# Patient Record
Sex: FEMALE | Race: WHITE | Employment: PART TIME | URBAN - METROPOLITAN AREA
[De-identification: names, ages, dates, MRNs, and addresses within clinical notes are randomized per-mention and may not be internally consistent; named-entity substitution may affect disease eponyms.]

---

## 2017-01-20 ENCOUNTER — HOSPITAL ENCOUNTER (OUTPATIENT)
Dept: PHYSICAL THERAPY | Age: 42
Discharge: HOME | End: 2017-01-20
Payer: OTHER GOVERNMENT

## 2017-01-20 PROCEDURE — 97110 THERAPEUTIC EXERCISES: CPT

## 2017-01-20 PROCEDURE — 97161 PT EVAL LOW COMPLEX 20 MIN: CPT

## 2017-01-20 PROCEDURE — 97014 ELECTRIC STIMULATION THERAPY: CPT

## 2017-01-20 PROCEDURE — 97140 MANUAL THERAPY 1/> REGIONS: CPT

## 2017-01-20 NOTE — PROGRESS NOTES
30 [facility] PHYSICAL THERAPY AT [facility]   [address] [facility] [address] [address]  Phone: [phone] Fax: [phone] / [address] [address] PHYSICAL THERAPY SERVICES  Patient Name: Lg Carter : 1975   Medical   Diagnosis: Acute back pain [M54.9] Treatment Diagnosis: LS and L SIJ pain    Onset Date: 2016      Referral Source: Jose Escobar MD Start of Care [facility]): 2017   Prior Hospitalization: See medical history Provider #: [id number]   Prior Level of Function: Functional I    Comorbidities: None noted    Medications: Verified on Patient Summary List   The Plan of Care and following information is based on the information from the initial evaluation.   ========================================================================  Assessment / key information:  Pt is a 46y.o. year old female who presents with co L sided SIJ and glut pain starting 2016 after intense [place] workout followed by long car drive (5 hours). Patient is alleviated by [facility]. Patient attempted chiropractic, but with increased pain. Patient had xay and MRI and was dx with \"early onset\" arthritis. Current deficits include: increased pain to 10/10 at worst, tautness of L gluteals and rotators effect SI mobility. Functional deficits include: increased am pain, laying supine and prone (ie lake kick ab exercise), sittin hour tolerance (work duties), elliptical, childcare - lifting, bending over back tub, unable to go barefoot sec to increased pain . Home exercise program initiated on initial evaluation to address these deficits.  Pt would benefit from PT to address these deficits for increased functional mobility and QOL.  ========================================================================  Eval Complexity: History: LOW Complexity : Zero comorbidities / personal factors that will impact the outcome / POCExam:HIGH Complexity : 4+ Standardized tests and measures addressing body structure, function, activity limitation and / or participation in recreation  Presentation: LOW Complexity : Stable, uncomplicated  Clinical Decision Making:MEDIUM Complexity : FOTO score of 26-74Overall Complexity:MEDIUM  Problem List: pain affecting function, decrease strength and decrease flexibility/ joint mobility   Treatment Plan may include any combination of the following: Therapeutic exercise, Therapeutic activities, Neuromuscular re-education, Physical agent/modality, Gait/balance training, Manual therapy, Aquatic therapy, Patient education and Other: IMT/ dry needling as needed  Patient / Family readiness to learn indicated by: asking questions, trying to perform skills and interest  Persons(s) to be included in education: patient (P)  Barriers to Learning/Limitations: None  Measures taken:    Patient Goal (s): \"wake without pain \"   Patient self reported health status: excellent  Rehabilitation Potential: good   Short Term Goals: To be accomplished in  1  weeks:  1. Pt will be independent and compliant with HEP   Long Term Goals: To be accomplished in  8-12  treatments:  1. Patient will increase FOTO score to 78/100 for indications of increased functional mobility. 2.  Patient will report pain less than 2/10 in am for ease with morning activities   3. Patient will be able to lay prone with min pain for return to preferred sleeping position at night   4.  Patient will be able to perform 100% bridge with no pain for improve SIJ stability and decrease risk of injury with lifting children   Frequency / Duration:   Patient to be seen  2  times per week for 8-12  treatments:  Patient / Caregiver education and instruction: self care, activity modification and exercises  G-Codes (GP): SAYRA  Therapist Signature: Nestor Rush PT Date: 5/69/0573   Certification Period: NA Time: 10:06 AM   ========================================================================  I certify that the above Physical Therapy Services are being furnished while the patient is under my care. I agree with the treatment plan and certify that this therapy is necessary. Physician Signature:        Date:       Time:   Please sign and return to [facility] or you may fax the signed copy to [phone]. Thank you.

## 2017-01-20 NOTE — PROGRESS NOTES
PT LUMBAR EVAL AND TREATMENT     Patient Name: Richard Beltran  Date:2017  : 1975  [x]  Patient  Verified  Payor:  / Plan: [employer] [address] AND DEPENDENTS / Product Type:  /    In time:1007  Out time:1105  Total Treatment Time (min): 58  Visit #: 1 of 8-12    Treatment Area: Acute back pain [M54.9]    SUBJECTIVE  Pain Level (0-10 scale): (C): 1 (B): 1 (W):  10  Any medication changes, allergies to medications, diagnosis change, or new procedure performed: see summary sheet for update  Subjective functional status/changes  CHIEF COMPLAINT: Patient reports with co L sided SIJ and glut pain starting 2016 after intense [place] workout followed by long car drive (5 hours). Patient is alleviated by [facility] [place] exercise. Patient attempted chiropractic, but with increased pain. Patient had xay and MRI and was dx with \"early onset\" arthritis.     Functional Status  Present functional limitations: increased am pain, laying supine and prone (ie lake kick ab exercise), sittin hour tolerance (work duties), elliptical, childcare - lifting, bending over back tub, unable to go barefoot sec to increased pain   Mechanism of injury: exercise   Symptoms:  Aggravated by: see functional limitations   Eased by: [facility] [place]     Past History/Treatments:  None noted   Diagnostic Tests: Xray     OBJECTIVE  Posture:  Lateral Shift: WNL   Kyphosis: WNL   Lordosis:  WNL     Gait:  WNL     Active Movements:  ROM % AROM Comments:pain, area   Forward flexion 40-60 WNL throughout No significant increase in pain    Extension 20-30     SB right 20-30     SB left 20-30     Rotation right 5-10     Rotation left 5-10         Dural Mobility:  Seated slump test denies radicular sx     Palpation taut bands of L gluts and piriformis     Strength  Hip : 5/5 grossly   Core: R sided pain     Special Tests: negative SI  gapping / compression tests     [place]:     Standing forward flexion test: WNL     Crests: equal     ASIS: equal     PSIS: equal          Hip: Lee Ann Test NT     Piriformis: - mild tightness B           Deficits: Kayley's:  NT    Ray:  NT    Hamstrings 90/90: mild tightness     Gastrocsoleus WNL     Other tests/comments:    Manual 10 min: piriformis/ gluteal  release in prone with hip IR/ER    Modality (rationale): decrease m spasm   [x]  E-Stim: type HV  x 10 min   Prone to L gluts with ice       Patient Education: [x]Established HEP    [x] POT  (minutes) :8    Pain Level (0-10 scale) post treatment: 0    ASSESSMENT  [x]  See Plan of Care    PLAN  [x]  Upgrade activities as tolerated      [x] Other:_  POC 2x8-12  Carmen Fuentes, PT 1/20/2017  10:05 AM    Justification for Eval Code Complexity:  Patient History : chronicity   Examination see exam   Clinical Presentation: stable   Clinical Decision Making : CARLA 09/930

## 2017-01-23 ENCOUNTER — HOSPITAL ENCOUNTER (OUTPATIENT)
Dept: PHYSICAL THERAPY | Age: 42
Discharge: HOME | End: 2017-01-23
Payer: OTHER GOVERNMENT

## 2017-01-23 PROCEDURE — 97110 THERAPEUTIC EXERCISES: CPT

## 2017-01-23 PROCEDURE — 97140 MANUAL THERAPY 1/> REGIONS: CPT

## 2017-01-23 PROCEDURE — 97014 ELECTRIC STIMULATION THERAPY: CPT

## 2017-01-23 NOTE — PROGRESS NOTES
PT DAILY TREATMENT NOTE     Patient Name: Karen Carl  Date:2017  : 1975  [x]  Patient  Verified  Payor:  / Plan: [employer] [address] AND DEPENDENTS / Product Type:  /    In time: 3:30pm     Out time: 4:12pm  Total Treatment Time (min): 42  Visit #: 2 of     Treatment Area: Acute back pain [M54.9]    SUBJECTIVE  Pain Level (0-10 scale): 0  Any medication changes, allergies to medications, adverse drug reactions, diagnosis change, or new procedure performed?: [x] No    [] Yes (see summary sheet for update)  Subjective functional status/changes:   [] No changes reported  \"I have been doing the stretches without a problem. 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   Min Type Additional Details   10 [x] Estim: []Att   [x]Unatt                 [x]HV                     []w/US   [x]w/ice   []w/heat  Position: prone  Location: (L) glut    []  Traction: [] Cervical       []Lumbar                       [] Prone          []Supine                       []Intermittent   []Continuous Lbs:  [] before manual  [] after manual    []  Ultrasound: []Continuous   [] Pulsed                           []1MHz   []3MHz Location:  W/cm2:    []  Iontophoresis with dexamethasone         Location: [] Take home patch   [] In clinic    []  Ice     []  heat  []  Ice massage Position:  Location:    []  Vasopneumatic Device Pressure:       [] lo [] med [] hi   Temperature: [] lo [] med [] hi   [x] Skin assessment post-treatment:  [x]intact []redness- no adverse reaction       []redness  adverse reaction:     22 min Therapeutic Exercise:  [x] See flow sheet: initiated therex per IE   Rationale: increase ROM and increase strength to improve the patients ability to perform work duties pain-free    10 min Manual Therapy:  prone piriformis and lower deep gluteal release/DTM in prone with hip IR/ER PROM   Rationale: decrease pain, increase ROM, increase tissue extensibility and decrease trigger points to improve patient's mobility for l          X min Patient Education: [x] Review HEP and progressed to include HAL therex 1 and 2       Other Objective/Functional Measures:     Pain Level (0-10 scale) post treatment: 0    ASSESSMENT/Changes in Function:   Good tolerance to first f/u treatment with patient req additional cueing for HAL therex, but able to demo proper isolation with repetition. Improved glut mm extensibility with emphasis on lower deep gluteals following manual interventions. Good release. Patient will continue to benefit from skilled PT services to modify and progress therapeutic interventions, address functional mobility deficits, address ROM deficits, address strength deficits, analyze and address soft tissue restrictions, analyze and cue movement patterns, analyze and modify body mechanics/ergonomics and assess and modify postural abnormalities to attain remaining goals. [x]  See Plan of Care  []  See progress note/recertification  []  See Discharge Summary         Progress towards goals / Updated goals:  Short Term Goals: To be accomplished in 1 weeks:  1. Pt will be independent and compliant with HEP -Goal met; patient reporting compliance (1/23/17)  Long Term Goals: To be accomplished in 8-12 treatments:  1. Patient will increase FOTO score to 78/100 for indications of increased functional mobility. 2. Patient will report pain less than 2/10 in am for ease with morning activities   3. Patient will be able to lay prone with min pain for return to preferred sleeping position at night -Goal met; patient able to lay prone during HVES with CP pain-free (1/23/17)  4.  Patient will be able to perform 100% bridge with no pain for improve SIJ stability and decrease risk of injury with lifting children     PLAN  [x]  Upgrade activities as tolerated     [x]  Continue plan of care  []  Update interventions per flow sheet       []  Discharge due to:_  [x]  Other: add HAL 3 and 4 to barry Dempsey, PTA 1/23/2017

## 2017-01-27 ENCOUNTER — HOSPITAL ENCOUNTER (OUTPATIENT)
Dept: PHYSICAL THERAPY | Age: 42
Discharge: HOME | End: 2017-01-27
Payer: OTHER GOVERNMENT

## 2017-01-27 PROCEDURE — 97014 ELECTRIC STIMULATION THERAPY: CPT

## 2017-01-27 PROCEDURE — 97110 THERAPEUTIC EXERCISES: CPT

## 2017-01-27 PROCEDURE — 97140 MANUAL THERAPY 1/> REGIONS: CPT

## 2017-01-27 NOTE — PROGRESS NOTES
PT DAILY TREATMENT NOTE     Patient Name: Lg Carter  Date:2017  : 1975  [x]  Patient  Verified  Payor:  / Plan: Noel [facility] DEPENDENTS / Product Type:  /    In time: 9:28am     Out time: 10:30am  Total Treatment Time (min): 62  Visit #: 3 of     Treatment Area: Acute back pain [M54.9]    SUBJECTIVE  Pain Level (0-10 scale): 2  Any medication changes, allergies to medications, adverse drug reactions, diagnosis change, or new procedure performed?: [x] No    [] Yes (see summary sheet for update)  Subjective functional status/changes:   [] No changes reported  \"I've been using a small pink bucket for the first exercise. I haven't seen much a change yet (*patient referring to return to pain-free activity*), but the pain hasn't been any more than a 3/10 (morning). I've been working a lot recently though. 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   Min Type Additional Details   10 [x] Estim:   [x]Unatt                   [x]HV                     []w/US   []w/ice   [x]w/heat  Position: prone with foam roll under ankles  Location: (L) piriformis    []  Traction: [] Cervical       []Lumbar                       [] Prone          []Supine                       []Intermittent   []Continuous Lbs:  [] before manual  [] after manual    []  Ultrasound: []Continuous   [] Pulsed                           []1MHz   []3MHz Location:  W/cm2:    []  Iontophoresis with dexamethasone         Location: [] Take home patch   [] In clinic    []  Ice     []  heat  []  Ice massage Position:  Location:    []  Vasopneumatic Device Pressure:       [] lo [] med [] hi   Temperature: [] lo [] med [] hi   [x] Skin assessment post-treatment:  [x]intact []redness- no adverse reaction       []redness  adverse reaction:     42 min Therapeutic Exercise:  [x] See flow sheet: added HAL #3 and #4   Rationale: increase ROM and increase strength to improve the patients ability to perform work duties pain-free    10 min Manual Therapy:  prone piriformis and lower deep gluteal release/DTM in prone with hip IR/ER PROM   Rationale: decrease pain, increase ROM, increase tissue extensibility and decrease trigger points to improve patient's mobility for l          X min Patient Education: [x] Review HEP and progressed to include HAL therex #3 and #4     Other Objective/Functional Measures:    Pain in AM: 3/10    Pain Level (0-10 scale) post treatment: 0    ASSESSMENT/Changes in Function:   Good tolerance to progression of therex to include HAL #3 and #4 with patient req additional cueing for proper form/technique with all therex. Following HAL#4, patient r/o (R) low back discomfort, possibly QL, therefore instructed patient to reduce UE lift at home to prevent compensation. Patient req cueing to maintain (R) LE in line with trunk during HAL#4. Noted inc mobility into hip IR L>R in prone position. Switched CP to MHP to aid in mm relaxation with bolster placed under ankles sec discomfort with prone position today. Patient will continue to benefit from skilled PT services to modify and progress therapeutic interventions, address functional mobility deficits, address ROM deficits, address strength deficits, analyze and address soft tissue restrictions, analyze and cue movement patterns, analyze and modify body mechanics/ergonomics and assess and modify postural abnormalities to attain remaining goals. [x]  See Plan of Care  []  See progress note/recertification  []  See Discharge Summary         Progress towards goals / Updated goals:  Short Term Goals: To be accomplished in 1 weeks:  1. Pt will be independent and compliant with HEP -Goal met; patient reporting strict compliance (1/27/17)  Long Term Goals: To be accomplished in 8-12 treatments:  1.  Patient will increase FOTO score to 78/100 for indications of increased functional mobility. 2. Patient will report pain less than 2/10 in am for ease with morning activities -Goal progressing at 3/10 worst pain, AM (1/27/17)  3. Patient will be able to lay prone with min pain for return to preferred sleeping position at night -Goal progressing; (+) pain at (L) low back and glut with prone lying (1/27/17)  4.  Patient will be able to perform 100% bridge with no pain for improve SIJ stability and decrease risk of injury with lifting children     PLAN  [x]  Upgrade activities as tolerated     [x]  Continue plan of care  []  Update interventions per flow sheet       []  Discharge due to:_  [x]  Other: assess pelvic alignment YARITZA Houston, PTA 1/27/2017

## 2017-01-30 ENCOUNTER — HOSPITAL ENCOUNTER (OUTPATIENT)
Dept: PHYSICAL THERAPY | Age: 42
Discharge: HOME | End: 2017-01-30
Payer: OTHER GOVERNMENT

## 2017-01-30 PROCEDURE — 97014 ELECTRIC STIMULATION THERAPY: CPT

## 2017-01-30 PROCEDURE — 97140 MANUAL THERAPY 1/> REGIONS: CPT

## 2017-01-30 PROCEDURE — 97110 THERAPEUTIC EXERCISES: CPT

## 2017-01-30 NOTE — PROGRESS NOTES
PT DAILY TREATMENT NOTE     Patient Name: Portia Wilson  Date:2017  : 1975  [x]  Patient  Verified  Payor:  / Plan: [facility] [address] AND DEPENDENTS / Product Type:  /    In time: 10:56am     Out time: 11:50am  Total Treatment Time (min): 54  Visit #: 4 of     Treatment Area: Acute back pain [M54.9]    SUBJECTIVE  Pain Level (0-10 scale): 1  Any medication changes, allergies to medications, adverse drug reactions, diagnosis change, or new procedure performed?: [x] No    [] Yes (see summary sheet for update)  Subjective functional status/changes:   [] No changes reported  \"I had a little pain, but not much. I have to leave by 11:50pm to  my mom. 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   Min Type Additional Details   10 [x] Estim:   [x]Unatt                   [x]IFC                  []w/US   [x]w/ice   []w/heat  Position: prone  Location: L/S    []  Traction: [] Cervical       []Lumbar                       [] Prone          []Supine                       []Intermittent   []Continuous Lbs:  [] before manual  [] after manual    []  Ultrasound: []Continuous   [] Pulsed                           []1MHz   []3MHz Location:  W/cm2:    []  Iontophoresis with dexamethasone         Location: [] Take home patch   [] In clinic    []  Ice     []  heat  []  Ice massage Position:  Location:    []  Vasopneumatic Device Pressure:       [] lo [] med [] hi   Temperature: [] lo [] med [] hi   [x] Skin assessment post-treatment:  [x]intact []redness- no adverse reaction       []redness  adverse reaction:     34 min Therapeutic Exercise:  [x] See flow sheet   Rationale: increase ROM and increase strength to improve the patients ability to perform work duties pain-free    10 min Manual Therapy:  SI check - (R) post rotation corrected with MET and shot gun; sacral float; prone piriformis and lower deep gluteal release/DTM in prone with hip IR/ER PROM   Rationale: decrease pain, increase ROM, increase tissue extensibility and decrease trigger points to improve patient's mobility for l          X min Patient Education: [x] Review HEP     Other Objective/Functional Measures:    SIJ stability: 80% bridge, pain-free, improved to 100% bridge following MET   (-) TTP to (R) iliopsoas    Pain Level (0-10 scale) post treatment: 0    ASSESSMENT/Changes in Function:   Patient is making good progress towards LTG#4. Notable pelvic obliquity corrected easily with audible click during shot gun. Noted patient amb with inc (R) toe out during TM walking, symmetrical following manual interventions. Patient will continue to benefit from skilled PT services to modify and progress therapeutic interventions, address functional mobility deficits, address ROM deficits, address strength deficits, analyze and address soft tissue restrictions, analyze and cue movement patterns, analyze and modify body mechanics/ergonomics and assess and modify postural abnormalities to attain remaining goals. [x]  See Plan of Care  []  See progress note/recertification  []  See Discharge Summary         Progress towards goals / Updated goals:  Short Term Goals: To be accomplished in 1 weeks:  1. Pt will be independent and compliant with HEP -Goal met; patient reporting strict compliance (1/27/17)  Long Term Goals: To be accomplished in 8-12 treatments:  1. Patient will increase FOTO score to 78/100 for indications of increased functional mobility. 2. Patient will report pain less than 2/10 in am for ease with morning activities -Goal progressing at 3/10 worst pain, AM (1/27/17)  3. Patient will be able to lay prone with min pain for return to preferred sleeping position at night -Goal met; patient able to lay prone following sacral float and MET (1/30/17)  4.  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  -Goal progressing, 100% bridge following manual interventions (1/30/17)    PLAN  [x]  Upgrade activities as tolerated     [x]  Continue plan of care  []  Update interventions per flow sheet       []  Discharge due to:_  [x]  Other: assess SIJ mobility NV and add self-correct to HEP if obliquity present    Mark Devoid, PTA 1/30/2017

## 2017-02-03 ENCOUNTER — HOSPITAL ENCOUNTER (OUTPATIENT)
Dept: PHYSICAL THERAPY | Age: 42
Discharge: HOME | End: 2017-02-03
Payer: OTHER GOVERNMENT

## 2017-02-03 PROCEDURE — 97014 ELECTRIC STIMULATION THERAPY: CPT

## 2017-02-03 PROCEDURE — 97110 THERAPEUTIC EXERCISES: CPT

## 2017-02-03 NOTE — PROGRESS NOTES
PT DAILY TREATMENT NOTE     Patient Name: Lambert Aase  Date:2/3/2017  : 1975  [x]  Patient  Verified  Payor:  / Plan: [employer] [address] AND DEPENDENTS / Product Type:  /    In time: 2:31pm     Out time: 3:21pm  Total Treatment Time (min): 50  Visit #: 5 of -    Treatment Area: Acute back pain [M54.9]    SUBJECTIVE  Pain Level (0-10 scale): 0  Any medication changes, allergies to medications, adverse drug reactions, diagnosis change, or new procedure performed?: [x] No    [] Yes (see summary sheet for update)  Subjective functional status/changes:   [] No changes reported  \"I have been feeling great! No back or hip pain since last time. I went back to running a little bit. Now, I just have this plantar fasciitis. 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   Min Type Additional Details   10 [x] Estim:   [x]Unatt                   [x]IFC                  []w/US   [x]w/ice   []w/heat  Position: prone  Location: L/S    []  Traction: [] Cervical       []Lumbar                       [] Prone          []Supine                       []Intermittent   []Continuous Lbs:  [] before manual  [] after manual    []  Ultrasound: []Continuous   [] Pulsed                           []1MHz   []3MHz Location:  W/cm2:    []  Iontophoresis with dexamethasone         Location: [] Take home patch   [] In clinic    []  Ice     []  heat  []  Ice massage Position:  Location:    []  Vasopneumatic Device Pressure:       [] lo [] med [] hi   Temperature: [] lo [] med [] hi   [x] Skin assessment post-treatment:  [x]intact []redness- no adverse reaction       []redness  adverse reaction:     39 min Therapeutic Exercise:  [x] See flow sheet: progressed HAL to 10x each   Rationale: increase ROM and increase strength to improve the patients ability to perform work duties pain-free    1 min Manual Therapy:  SI check - no rotation   Rationale: decrease pain, increase ROM, increase tissue extensibility and decrease trigger points 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 Patient reports being pain-free since last treatment and able to awake and sleep throughout the night pain-free. Notable improvement in functional mobility as patient able to return to running for short-distances. Patient r/o fear of pain returning and request to cont PT for remaining scheduled appointments as allotted by POC. Patient will continue to benefit from skilled PT services to modify and progress therapeutic interventions, address functional mobility deficits, address ROM deficits, address strength deficits, analyze and address soft tissue restrictions, analyze and cue movement patterns, analyze and modify body mechanics/ergonomics and assess and modify postural abnormalities to attain remaining goals. [x]  See Plan of Care  []  See progress note/recertification  []  See Discharge Summary         Progress towards goals / Updated goals:  Short Term Goals: To be accomplished in 1 weeks:  1. Pt will be independent and compliant with HEP -Goal met; patient reporting strict compliance (1/27/17)  Long Term Goals: To be accomplished in 8-12 treatments:  1. Patient will increase FOTO score to 78/100 for indications of increased functional mobility. 2. Patient will report pain less than 2/10 in am for ease with morning activities -Goal met; 0/10 pain in AM for past week (2/3/17)  3.  Patient will be able to lay prone with min pain for return to preferred sleeping position at night -Goal met; patient able to lay prone following sacral float and MET (1/30/17) - good carryover, pain-free in prone (2/3/17)  4. 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  -Goal progressing, 100% bridge following manual interventions (1/30/17)     PLAN  [x]  Upgrade activities as tolerated     [x]  Continue plan of care  []  Update interventions per flow sheet       []  Discharge due to:_  []  Other:_    Nuzhat Houston, PTA 2/3/2017

## 2017-02-06 ENCOUNTER — HOSPITAL ENCOUNTER (OUTPATIENT)
Dept: PHYSICAL THERAPY | Age: 42
Discharge: HOME | End: 2017-02-06
Payer: OTHER GOVERNMENT

## 2017-02-06 PROCEDURE — 97014 ELECTRIC STIMULATION THERAPY: CPT

## 2017-02-06 PROCEDURE — 97110 THERAPEUTIC EXERCISES: CPT

## 2017-02-06 NOTE — PROGRESS NOTES
PT DAILY TREATMENT NOTE     Patient Name: Dayton Cha  Date:2017  : 1975  [x]  Patient  Verified  Payor:  / Plan: [employer] [facility] [address] AND DEPENDENTS / Product Type:  /    In time:9:32  Out time:10:34  Total Treatment Time (min): 62  Total Timed Codes (min): 52  1:1 Treatment Time (min): 52   Visit #: 6 of     Treatment Area: Acute back pain [M54.9]    SUBJECTIVE  Pain Level (0-10 scale): 0  Any medication changes, allergies to medications, adverse drug reactions, diagnosis change, or new procedure performed?: [x] No    [] Yes (see summary sheet for update)  Subjective functional status/changes:   [] No changes reported  The L foot, heel pain is the worst now. Bad in the morning. 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 OBJECTIVE  Modality rationale: decrease pain and increase tissue extensibility to improve the patients ability to improve return to running    Min Type Additional Details   10 [x] Estim: []Att   [x]Unatt        []TENS instruct                  []IFC  []Premod   []NMES                     [x]Other: HV []w/US   [x]w/ice   []w/heat  Position: prone  Location:L/S   [x] Skin assessment post-treatment:  [x]intact []redness- no adverse reaction       []redness  adverse reaction:       48 min Therapeutic Exercise:  [x] See flow sheet :   Rationale: increase ROM, increase strength and improve coordination to improve the patients ability to return to running     4 min Manual Therapy:  R posterior innom MET, shotgun. Rationale: decrease pain, increase ROM, increase tissue extensibility and decrease trigger points to improve mobility, lifting, flexion           x min Patient Education: [x] Review HEP    [x] Progressed/Changed HEP based on: TA with dead bug, shotgun and self-correction; REIL to rule-out radicular sxs in the foot.      [] positioning   [] body mechanics   [] transfers   [] heat/ice application        Other Objective/Functional Measures:   TA contraction: pt dominates with RA contraction. Able to improve with VC and TC  Pt demo's difficulty with L LE dead bug, given for HEP  Plyos: initiate jumping in place, forward/backwards and side to side for 30\" with no c/o SIJ or back pain. Min c/o Plantar fascia pain that is unchanged with REIL  Self-correction: R posterior innom    Pain Level (0-10 scale) post treatment: 0    ASSESSMENT/Changes in Function: Pt demo's R posterior innom rotation, minimally, corrected with Shotgun and MET. Given MET for HEP to self-correct. Good response to plyo initiation today for a return to run. Pt challenged by single leg/split stance activities and maintaining pelvic positioning/TA draw. Initiated simple dead bugs and single leg bridges to improve     Patient will continue to benefit from skilled PT services to modify and progress therapeutic interventions, address functional mobility deficits, address ROM deficits, address strength deficits, analyze and address soft tissue restrictions, analyze and cue movement patterns, analyze and modify body mechanics/ergonomics, assess and modify postural abnormalities and instruct in home and community integration to attain remaining goals. []  See Plan of Care  []  See progress note/recertification  []  See Discharge Summary         Progress towards goals / Updated goals:  Short Term Goals: To be accomplished in 1 weeks:  1. Pt will be independent and compliant with HEP -Goal met; patient reporting strict compliance (1/27/17)  Long Term Goals: To be accomplished in 8-12 treatments:  1. Patient will increase FOTO score to 78/100 for indications of increased functional mobility. 2. Patient will report pain less than 2/10 in am for ease with morning activities -Goal met; 0/10 pain in AM for past week (2/3/17)  3.  Patient will be able to lay prone with min pain for return to preferred sleeping position at night -Goal met; patient able to lay prone following sacral float and MET (1/30/17) - good carryover, pain-free in prone (2/3/17)  4. 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  -Goal progressing, 100% bridge and progressed to single leg, but reduced ROM (2/6/17)  PLAN  [x]  Upgrade activities as tolerated     []  Continue plan of care  []  Update interventions per flow sheet       []  Discharge due to:_  [x]  Other:_ assess plyometrics     Connor Vernon, PT 2/6/2017  7:47 AM

## 2017-02-10 ENCOUNTER — HOSPITAL ENCOUNTER (OUTPATIENT)
Dept: PHYSICAL THERAPY | Age: 42
Discharge: HOME | End: 2017-02-10
Payer: OTHER GOVERNMENT

## 2017-02-10 PROCEDURE — 97110 THERAPEUTIC EXERCISES: CPT

## 2017-02-10 PROCEDURE — 97140 MANUAL THERAPY 1/> REGIONS: CPT

## 2017-02-10 NOTE — PROGRESS NOTES
PT DAILY TREATMENT NOTE     Patient Name: Rohan Dee  Date:2/10/2017  : 1975  [x]  Patient  Verified  Payor:  / Plan: [address] [address] AND DEPENDENTS / Product Type:  /    In time: 1:55pm      Out time: 3:06pm  Total Treatment Time (min): 71  Visit #: 7 of     Treatment Area: Acute back pain [M54.9]    SUBJECTIVE  Pain Level (0-10 scale): 0; (L) foot pain  Any medication changes, allergies to medications, adverse drug reactions, diagnosis change, or new procedure performed?: [x] No    [] Yes (see summary sheet for update)  Subjective functional status/changes:   [] No changes reported  \"I went back to running (~2.5miles, 3x/week) and had some really bad back and foot pain until Wednesday. I've been doing the exercises and have no pain now, but my left foot still hurts. \"    OBJECTIVE  Modality rationale: decrease pain and increase tissue extensibility to improve the patients ability to improve return to running    Min Type Additional Details   NI sec no pain [x] Estim: []Att   [x]Unatt        []TENS instruct                  []IFC  []Premod   []NMES                     [x]Other: HV []w/US   [x]w/ice   []w/heat  Position: prone  Location:L/S   10 [x] Heat to (L) GSC   [x] Ice to (L) ankle Position: supine   [x] Skin assessment post-treatment:  [x]intact []redness- no adverse reaction       []redness  adverse reaction:       51 min Therapeutic Exercise:  [x] See flow sheet: added DL leg press with eccentric lowering and TA draw; plyos (hop in place, F/B, lateral, 3x10 each) with emphasis on quad and glut max control during triple flexion   Rationale: increase ROM, increase strength and improve coordination to improve the patients ability to return to running     10 min Manual Therapy:  Upon SI check, minimal (R) post inn rotation corrected with MET and shotgun; prone sacral float to correct for (+) sacral torsion; (L) hip PROM IR/ER   Rationale: decrease pain, increase ROM, increase tissue extensibility and decrease trigger points to improve mobility, lifting, flexion           X min Patient Education: [x] Review HEP      Other Objective/Functional Measures:    (-) TTP to (L) piriformis   Core strength: 50% bridge with SL knee extension and TA draw     Pain Level (0-10 scale) post treatment: 0    ASSESSMENT/Changes in Function:   Patient r/o inc SIJ pain following last treatment abolished with HEP compliance, but cont (L) heel pain. With plyometrics, noted uneven displacement of weight L>R with poor eccentric quad and glut max control during triple flexion landing resulting in inc gastroc reliance - poor carryover with cueing to correct. Upon palpation, noted tenderness along L4-L5-sacral junction (note: gastroc L5-S2) and (+) R sacral torsion during seated forward flexion testing - cont to encourage prone pressups per HEP. Added DL leg press with cueing for eccentric lowering and maintenance of TA draw to provide stable ant pelvic stability and prepare patient for return to running. Patient will continue to benefit from skilled PT services to modify and progress therapeutic interventions, address functional mobility deficits, address ROM deficits, address strength deficits, analyze and address soft tissue restrictions, analyze and cue movement patterns, analyze and modify body mechanics/ergonomics, assess and modify postural abnormalities and instruct in home and community integration to attain remaining goals. [x]  See Plan of Care  []  See progress note/recertification  []  See Discharge Summary         Progress towards goals / Updated goals:  Short Term Goals: To be accomplished in 1 weeks:  1. Pt will be independent and compliant with HEP -Goal met; patient reporting strict compliance (1/27/17)  Long Term Goals: To be accomplished in 8-12 treatments:  1. Patient will increase FOTO score to 78/100 for indications of increased functional mobility.    2. Patient will report pain less than 2/10 in am for ease with morning activities -Goal met; 0/10 pain in AM for past week (2/3/17) - 0/10 pain upon waking this morning (2/10/17)  3. Patient will be able to lay prone with min pain for return to preferred sleeping position at night -Goal met; patient able to lay prone following sacral float and MET (1/30/17) - good carryover, pain-free in prone (2/3/17) - pain-free in prone today (2/10/17)  4. 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  -Goal progressing, 100% bridge and progressed to single leg, but reduced 50% AROM with SL (2/10/17)     PLAN  [x]  Upgrade activities as tolerated     [x]  Continue plan of care  []  Update interventions per flow sheet       []  Discharge due to:_  []  Other:_    Rhonda Wu, ANJU  2/10/2017

## 2017-02-13 ENCOUNTER — HOSPITAL ENCOUNTER (OUTPATIENT)
Dept: PHYSICAL THERAPY | Age: 42
Discharge: HOME | End: 2017-02-13
Payer: OTHER GOVERNMENT

## 2017-02-13 PROCEDURE — 97140 MANUAL THERAPY 1/> REGIONS: CPT

## 2017-02-13 PROCEDURE — 97110 THERAPEUTIC EXERCISES: CPT

## 2017-02-13 NOTE — PROGRESS NOTES
PT DAILY TREATMENT NOTE     Patient Name: Lisa Sibley  Date:2017  : 1975  [x]  Patient  Verified  Payor:  / Plan: [employer] [facility] [address] AND DEPENDENTS / Product Type:  /    In time:9:19  Out time:10:08  Total Treatment Time (min): 49  Total Timed Codes (min): 49  1:1 Treatment Time (min): 49   Visit #: 8 of     Treatment Area: Acute back pain [M54.9]    SUBJECTIVE  Pain Level (0-10 scale): 0 L/S, 2/10 foot   Any medication changes, allergies to medications, adverse drug reactions, diagnosis change, or new procedure performed?: [x] No    [] Yes (see summary sheet for update)  Subjective functional status/changes:   [] No changes reported  The L butt is sores but i'm still doing barre. OBJECTIVE      34 min Therapeutic Exercise:  [x] See flow sheet : initiate hip flexor, quad and gastroc stretch. Rationale: increase ROM, increase strength, improve coordination and improve balance to improve the patients ability to improve gait for running     15 min Manual Therapy:  + shotgun, MET for L posterior innom; DTm and TrP release to the L medial > lateral and distal gastric; Talus mobs to improve inversion/eversion and appropriate midfoot pronation. MET for R sacral torsion. Sacral PA's . TCJ manip (L) with audible cavitation. Rationale: decrease pain and increase ROM to improve mobility, symmetry for pain-free return to running           x min Patient Education: [x] Review HEP    [x] Progressed/Changed HEP based on: SLS for stability and midfoot retraining/mechanics. Hip flexor, quad, gastroc stretching. Hold on running and plyos at this time due to c/o pain in the foot with running. Other Objective/Functional Measures:     + shotgun decrease c/o \"tightness\"  + hip flexor, quad tightness (L)  Significant TrP in the L gastroc    Foot mechanics: 1st ray DF, decreased calc eversion L > R. WNL great toe extension (B).  Decreased midfoot mobility L > R. Decreased navicular drop in stance. Pt demo's increased navicular drop in stance following MT.   1/2 kneeling stability: pt demo's excessive effort and pelvic drop in L > R LE stance position. Added to HEP with tband around knee/ankle for increased glute activation to improve stability and ease of effort. VC about pelvic symmetry ,avoiding hip drop during barre exercise     SLS (L): increased sway, Compensated Trendelenburg. Pain Level (0-10 scale) post treatment: 0    ASSESSMENT/Changes in Function: Pt demo's hip flexor and quad tightness on the (L) which can account for con't with sacral rotation, L/S pain. Also, noted deficits in midfoot and TCJ mobility of the (L) which account for issues up the kinetic chain, resulting in Sacral pain. Patient will continue to benefit from skilled PT services to modify and progress therapeutic interventions, address functional mobility deficits, address ROM deficits, address strength deficits, analyze and address soft tissue restrictions, analyze and cue movement patterns, analyze and modify body mechanics/ergonomics, assess and modify postural abnormalities, address imbalance/dizziness and instruct in home and community integration to attain remaining goals. []  See Plan of Care  []  See progress note/recertification  []  See Discharge Summary         Progress towards goals / Updated goals:  Short Term Goals: To be accomplished in 1 weeks:  1. Pt will be independent and compliant with HEP -Goal met; patient reporting strict compliance (1/27/17)  Long Term Goals: To be accomplished in 8-12 treatments:  1. Patient will increase FOTO score to 78/100 for indications of increased functional mobility. 2. Patient will report pain less than 2/10 in am for ease with morning activities -Goal met; 0/10 pain in AM for past week (2/3/17) - 0/10 pain upon waking this morning (2/10/17)  3.  Patient will be able to lay prone with min pain for return to preferred sleeping position at night -Goal met; patient able to lay prone following sacral float and MET (1/30/17) - good carryover, pain-free in prone (2/3/17) - pain-free in prone today (2/10/17)  4. 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 -Goal progressing, 100% bridge and progressed to single leg, but reduced 50% AROM with SL (2/10/17)     PLAN  [x]  Upgrade activities as tolerated     []  Continue plan of care  []  Update interventions per flow sheet       []  Discharge due to:_  []  Other:_   Continue with addition of TCJ and midfoot mobs to improve foot mechanics and overall CKC stability. On next Progress note, please add \"We wish to add focused treatment at the (L) foot and ankle complex for c/o plantar fasciitis which occurred at the same time as the current SIJ pain and dysfunction. Believe pt will benefit from treatment to address the foot/ankle which will improve the whole kinetic chain for ambulation/running mechanics and return to PLOF\".  Thank you     Lea Jeronimo, PT 2/13/2017  7:37 AM

## 2017-02-17 ENCOUNTER — HOSPITAL ENCOUNTER (OUTPATIENT)
Dept: PHYSICAL THERAPY | Age: 42
Discharge: HOME | End: 2017-02-17
Payer: OTHER GOVERNMENT

## 2017-02-17 PROCEDURE — 97140 MANUAL THERAPY 1/> REGIONS: CPT

## 2017-02-17 PROCEDURE — 97110 THERAPEUTIC EXERCISES: CPT

## 2017-02-17 NOTE — PROGRESS NOTES
PT DAILY TREATMENT NOTE -    Patient Name: Dayton Cha  Date:2017  : 1975  [x]  Patient  Verified  Payor:  / Plan: [employer] [facility] [address] AND DEPENDENTS / Product Type:  /    In time: 9:04am    Out time: 9:40am  Total Treatment Time (min): 36   Visit #: 9 of     Treatment Area: Acute back pain [M54.9]    SUBJECTIVE  Pain Level (0-10 scale): 0 L/S  Any medication changes, allergies to medications, adverse drug reactions, diagnosis change, or new procedure performed?: [x] No    [] Yes (see summary sheet for update)  Subjective functional status/changes:   [] No changes reported  \"I don't have any hip pain and my foot is better since I've held off on the running. \"     OBJECTIVE  23 min Therapeutic Exercise:  [x] See flow sheet: added SB DB (UE lift with march)   Rationale: increase ROM, increase strength, improve coordination and improve balance to improve the patients ability to improve gait for running     13 min Manual Therapy:  SI check - no rotation; DTM/TPR to the (L) medial > lateral and distal gastroc; talus mobs to improve inversion/eversion; sacral float with patient instructed to PPU; (L) ankle PROM in all directions   Rationale: decrease pain and increase ROM to improve mobility, symmetry for pain-free return to running           X min Patient Education: [x] Review HEP - added SB UE lift with march (patient declined photos)         Other Objective/Functional Measures:    (+) sacral torsion upon forward flexion testing; attempted sacral float with no change in sacral positioning, therefore,  added with PPU for MWM with (+) mobility     Added SB deadbugs (UE, LE march) for inc TA engagement cueing for neutral L/S lordosis - no rounding to utilizing  RA, to overarching to utilize QL/LPS  - added to HEP => difficulty reported. Cont compensated Trendelenburg with (L) SLS improved, but not fully corrected, with YTB resistance.      Patient informed of pending reassessment NV and agreeable to cont PT 2x4 for 8 treatments. Pain Level (0-10 scale) post treatment: 0    ASSESSMENT/Changes in Function:   Cont TA instability, however patient addressing with current HEP progression and addition of SB mod deadbugs (patient r/o sitting on SB throughout the day for work). No rotation noted upon SI check, but (+) sacral torsion corrected with MWM. Improved navicular positioning with R=L. Patient will continue to benefit from skilled PT services to modify and progress therapeutic interventions, address functional mobility deficits, address ROM deficits, address strength deficits, analyze and address soft tissue restrictions, analyze and cue movement patterns, analyze and modify body mechanics/ergonomics, assess and modify postural abnormalities, address imbalance/dizziness and instruct in home and community integration to attain remaining goals. [x]  See Plan of Care  []  See progress note/recertification  []  See Discharge Summary         Progress towards goals / Updated goals:  Short Term Goals: To be accomplished in 1 weeks:  1. Pt will be independent and compliant with HEP -Goal met; patient reporting strict compliance (1/27/17)  Long Term Goals: To be accomplished in 8-12 treatments:  1. Patient will increase FOTO score to 78/100 for indications of increased functional mobility. 2. Patient will report pain less than 2/10 in am for ease with morning activities -Goal met; 0/10 pain in AM for past week (2/3/17) - 0/10 pain upon waking this morning (2/10/17)  3. Patient will be able to lay prone with min pain for return to preferred sleeping position at night -Goal met; patient able to lay prone following sacral float and MET (1/30/17) - good carryover, pain-free in prone (2/3/17) - pain-free in prone today (2/10/17)  4. 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  -Goal progressing, 100% bridge and progressed to single leg, but reduced 50% AROM with SL (2/10/17)     PLAN  [x]  Upgrade activities as tolerated     [x]  Continue plan of care  []  Update interventions per flow sheet       []  Discharge due to:_  [x]  Other: continue with addition of TCJ and midfoot mobs to improve foot mechanics and overall CKC stability. On next progress note, please add \"We wish to add focused treatment at the (L) foot and ankle complex for c/o plantar fasciitis which occurred at the same time as the current SIJ pain and dysfunction. Believe pt will benefit from treatment to address the foot/ankle which will improve the whole kinetic chain for ambulation/running mechanics and return to PLOF\".  Thank you     Michael Acevedo, PTA 2/17/2017

## 2017-02-21 ENCOUNTER — HOSPITAL ENCOUNTER (OUTPATIENT)
Dept: PHYSICAL THERAPY | Age: 42
Discharge: HOME | End: 2017-02-21
Payer: OTHER GOVERNMENT

## 2017-02-21 PROCEDURE — 97140 MANUAL THERAPY 1/> REGIONS: CPT

## 2017-02-21 PROCEDURE — 97110 THERAPEUTIC EXERCISES: CPT

## 2017-02-21 NOTE — PROGRESS NOTES
[address] [facility] AT [address]. [facility] [address] [facility], [place] [address]  Phone: [phone] Fax: [phone]  PROGRESS NOTE  Patient Name: Lg Carter : 1975   Treatment/Medical Diagnosis: Acute back pain [M54.9]   Referral Source: Jose Escobar MD     Date of Initial Visit: 17 Attended Visits: 10 Missed Visits: 0     SUMMARY OF TREATMENT  Pt is a 46y.o. year old female who presents with co L sided SIJ and glut pain starting 2016 after intense [place] workout followed by long car drive (5 hours). Treatment has consisted of following: Therapeutic exercise, Neuromuscular re-education, Physical agent/modality, Gait/balance training, Manual therapy, Patient education. CURRENT STATUS  Patient has made good progress since initiating therapy. Assessment as follows:  Improvements: sitting tolerance, lifting, bending over bathtub  Deficits: running sec (L) foot pain, glut med weakness with substitutions, persistent GSC tightness with amb on uneven surfaces, inability to amb barefoot without foot pain  FOTO score: 84/100 (at last assessment, 68/100)   (-) pelvic obliquity  Running analysis: mild valgus collapse during (L) stance phase  L/S AROM: flex fingertips to toes; WNL in all directions  Hip strength: flex 4+/5, abd 3/5 (5/5 with QL substitutions), ext 5/5  Core strength: 100% bridge  Pain: (A) 0    Goal/Measure of Progress   1. Patient will increase FOTO score to 78/100 for indications of increased functional mobility. -Goal met at 84/100  2. Patient will report pain less than 2/10 in am for ease with morning activities -Goal met; 0/10 pain in AM (17)  3. Patient will be able to lay prone with min pain for return to preferred sleeping position at night -Goal met; patient able to lay prone pain-free in L/S or hip (17)  4.  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 -Goal met; 100% bridge (2/21/17)     New Goals to be achieved in __8__  treatments:  1. Patient will be (I) with progression of HEP in preparation for D/C.  2.  Patient will demo 4+/5 hip ABD strength without substitutions to improve stability for running. 3.  Patient will report 80% improvement in symptoms indicating significant inc in QOL. RECOMMENDATIONS  We wish to add focused treatment at the (L) foot and ankle complex for c/o plantar fasciitis which occurred at the same time as the current SIJ pain and dysfunction. Believe pt will benefit from treatment to address the foot/ankle which will improve the whole kinetic chain for ambulation/running mechanics and return to PLOF. Recommend continue for 2x4 for 8 treatments to address deficits and achieve aforementioned goals. Thank you! If you have any questions/comments please contact us directly at [phone]. Thank you for allowing us to assist in the care of your patient. LPTA Signature: Joyce Anguiano PTA  Date: 2/21/2017   PT Signature:  Time: 3:41 PM   NOTE TO PHYSICIAN:  PLEASE COMPLETE THE ORDERS BELOW AND FAX TO   [facility]: [phone]. If you are unable to process this request in 24 hours please contact our office: [phone].  ___ I have read the above report and request that my patient continue as recommended.   ___ I have read the above report and request that my patient continue therapy with the following changes/special instructions:_________________________________________________________   ___ I have read the above report and request that my patient be discharged from therapy.      Physician Signature:        Date:       Time:

## 2017-02-21 NOTE — PROGRESS NOTES
PT DAILY TREATMENT NOTE     Patient Name: Kwesi Busby  Date:2017  : 1975  [x]  Patient  Verified  Payor:  / Plan: [address] [facility] [address] AND DEPENDENTS / Product Type:  /    In time: 1:58pm    Out time: 3:00pm  Total Treatment Time (min): 62  Visit #: 10 of     Treatment Area: Acute back pain [M54.9]    SUBJECTIVE  Pain Level (0-10 scale): 0 in L/S; 2 in (L) foot  Any medication changes, allergies to medications, adverse drug reactions, diagnosis change, or new procedure performed?: [x] No    [] Yes (see summary sheet for update)  Subjective functional status/changes:   [] No changes reported  \"I felt awesome after last time, but my foot is killing me today. I still do those exercises from the first day every day. \"    OBJECTIVE  39 min Therapeutic Exercise:  [x] See flow sheet: assisted patient with FOTO completion   Rationale: increase ROM, increase strength, improve coordination and improve balance to improve the patients ability to improve gait for running     23 min Manual Therapy:  Reassessment; prone DTM/rolling/TPR to post [address] and peroneals; (L) ankle PROM in all directions; navicular mobs; SI check - no rotation   Rationale: decrease pain and increase ROM to improve mobility, symmetry for pain-free return to running           X min Patient Education: [x] Review HEP         Other Objective/Functional Measures:    Improvements: sitting tolerance, lifting, bending over bathtub   Deficits: running sec (L) foot pain, glut med weakness with substitutions   FOTO score: 84/100 (at last assessment, 68/100)    (-) pelvic obliquity   L/S AROM: flex fingertips to toes; WNL in all directions   Hip strength: flex 4+/5, abd 3/5 (5/5 with substitutions), ext 5/5   Core strength: 100% bridge   Pain: (A) 0      Patient agreeable to D/C sec improvements in PT. Pain Level (0-10 scale) post treatment: 0; (L) foot pain    ASSESSMENT/Changes in Function:   See PN.     Patient will continue to benefit from skilled PT services to modify and progress therapeutic interventions, address functional mobility deficits, address ROM deficits, address strength deficits, analyze and address soft tissue restrictions, analyze and cue movement patterns, analyze and modify body mechanics/ergonomics, assess and modify postural abnormalities, address imbalance/dizziness and instruct in home and community integration to attain remaining goals. []  See Plan of Care  [x]  See progress note/recertification (5/63/48)  []  See Discharge Summary         Progress towards goals / Updated goals:  Short Term Goals: To be accomplished in 1 weeks:  1. Pt will be independent and compliant with HEP -Goal met; patient reporting strict compliance (1/27/17)  Long Term Goals: To be accomplished in 8-12 treatments:  1. Patient will increase FOTO score to 78/100 for indications of increased functional mobility. -Goal met at 84/100  2. Patient will report pain less than 2/10 in am for ease with morning activities -Goal met; 0/10 pain in AM (2/21/17)  3. Patient will be able to lay prone with min pain for return to preferred sleeping position at night -Goal met; patient able to lay prone pain-free in L/S or hip (2/21/17)  4. 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 -Goal met; 100% bridge (2/21/17)     PLAN  [x]  Upgrade activities as tolerated     [x]  Continue plan of care  []  Update interventions per flow sheet       []  Discharge due to:_  [x]  Other: continue with addition of TCJ and midfoot mobs to improve foot mechanics and overall CKC stability. On next progress note, please add \"We wish to add focused treatment at the (L) foot and ankle complex for c/o plantar fasciitis which occurred at the same time as the current SIJ pain and dysfunction.  Believe pt will benefit from treatment to address the foot/ankle which will improve the whole kinetic chain for ambulation/running mechanics and return to PLOF\".  Thank you     Sallie Perez, PTA 2/21/2017

## 2017-02-27 ENCOUNTER — HOSPITAL ENCOUNTER (OUTPATIENT)
Dept: PHYSICAL THERAPY | Age: 42
Discharge: HOME | End: 2017-02-27
Payer: OTHER GOVERNMENT

## 2017-02-27 PROCEDURE — 97140 MANUAL THERAPY 1/> REGIONS: CPT

## 2017-02-27 PROCEDURE — 97110 THERAPEUTIC EXERCISES: CPT

## 2017-02-27 NOTE — PROGRESS NOTES
PT DAILY TREATMENT NOTE     Patient Name: Laith Hutton  Date:2017  : 1975  [x]  Patient  Verified  Payor:  / Plan: [address] [facility] [address] AND DEPENDENTS / Product Type:  /    In time: 2:28pm    Out time: 3:26pm  Total Treatment Time (min): 62  Visit #: 1 of     Treatment Area: Acute back pain [M54.9]    SUBJECTIVE  Pain Level (0-10 scale): 1  Any medication changes, allergies to medications, adverse drug reactions, diagnosis change, or new procedure performed?: [x] No    [] Yes (see summary sheet for update)  Subjective functional status/changes:   [] No changes reported  \"I have had a lot of pain in my right hip today and my left foot. I've been doing the exercises, including the bridges, but don't feel like I'm getting stronger. I want to go back to running. \"    OBJECTIVE  33 min Therapeutic Exercise:  [x] See flow sheet: added clam II with YTB, SB anti-rotation, and cont SL bridge and SB DB   Rationale: increase ROM, increase strength, improve coordination and improve balance to improve the patients ability to improve gait for running     25 min Manual Therapy:  SI check - (R) upslip corrected with LE traction with distraction; shot gun with cavitation to correct if hypomobile; reassessed with good hip mobility bilaterally; navicular mobs; windlass taping to (L) foot to provide arch   Rationale: decrease pain and increase ROM to improve mobility, symmetry for pain-free return to running           X min Patient Education: [x] Review HEP; encouraged modification of barre exercise to avoid L/S hyperextension/rotation (standing glut kicks, quadriped alt UE/LE raises); cont to encourage patient to refrain from running until symptoms resolve and TA strength improves         Other Objective/Functional Measures:     Pain Level (0-10 scale) post treatment: 0    ASSESSMENT/Changes in Function:   Patient demo significant TA and glut MAX weakness as indicated by inability to maintain SL bridge without UE support (instructed patient to hold ball in front of chest). Upon SI check, noted (R) upslip corrected with LE traction - most likely sec barre class. Initiated windlass taping to assess effectiveness of providing external arch support in aiding foot/calf symptoms - tolerated well with patient reporting no pain with ambulation. Patient challenged by Zbigniew Fleming. Patient will continue to benefit from skilled PT services to modify and progress therapeutic interventions, address functional mobility deficits, address ROM deficits, address strength deficits, analyze and address soft tissue restrictions, analyze and cue movement patterns, analyze and modify body mechanics/ergonomics, assess and modify postural abnormalities, address imbalance/dizziness and instruct in home and community integration to attain remaining goals. []  See Plan of Care  [x]  See progress note/recertification (1/69/01)  []  See Discharge Summary         Progress towards goals / Updated goals:  1. Patient will be (I) with progression of HEP in preparation for D/C. -Goal progressing; patient reporting compliance with HAL and SL bridges (2/27/17)  2. Patient will demo 4+/5 hip ABD strength without substitutions to improve stability for running. -Quick onset of fatigue with clam II (2/27/17)  3. Patient will report 80% improvement in symptoms indicating significant inc in QOL.     PLAN  [x]  Upgrade activities as tolerated     [x]  Continue plan of care  []  Update interventions per flow sheet       []  Discharge due to:_  []  Other:_    Teressa Tripp, PTA 2/27/2017

## 2017-03-03 ENCOUNTER — HOSPITAL ENCOUNTER (OUTPATIENT)
Dept: PHYSICAL THERAPY | Age: 42
Discharge: HOME | End: 2017-03-03
Payer: OTHER GOVERNMENT

## 2017-03-03 NOTE — PROGRESS NOTES
PT DAILY TREATMENT NOTE     Patient Name: Ailyn Zacarias  Date:3/3/2017  : 1975  [x]  Patient  Verified  Payor:  / Plan: [address] AND DEPENDENTS / Product Type:  /    In time: 9:23am      Out time: 10:20am  Total Treatment Time (min): 62  Visit #: 2 of     Treatment Area: Acute back pain [M54.9]    SUBJECTIVE  Pain Level (0-10 scale): 0.5  Any medication changes, allergies to medications, adverse drug reactions, diagnosis change, or new procedure performed?: [x] No    [] Yes (see summary sheet for update)  Subjective functional status/changes:   [] No changes reported  \"I felt awesome after you taped me. Did yoga and everything felt great. I also noticed that I'm better with the single leg bridges. \"    OBJECTIVE  45 min Therapeutic Exercise:  [x] See flow sheet: resumed HAL, added clam I, II, III with YTB (pt reports glut isolation); cont stretching   Rationale: increase ROM, increase strength, improve coordination and improve balance to improve the patients ability to improve gait for running     12 min Manual Therapy:  navicular check, 1-2cm drop; windlass taping to (L) foot, 0.2cm drop navicular; (L) ankle/foot PROM   Rationale: decrease pain and increase ROM to improve mobility, symmetry for pain-free return to running           X min Patient Education: [x] Review HEP     Other Objective/Functional Measures:     Pain Level (0-10 scale) post treatment: 0    ASSESSMENT/Changes in Function:   Patient reports windlass taping aided in pain relief for 2 days until \"tape fell off\" due to hot showers and hot yoga - patient reported placing bag around tape to inc longevity. Encouraged custom orthotic to aid in stabilizing arch and faxed verbal order request for MD confirmation - patient expressed intent to find external arch support at [place]. Improved core control with SL bridges today.     Patient will continue to benefit from skilled PT services to modify and progress therapeutic interventions, address functional mobility deficits, address ROM deficits, address strength deficits, analyze and address soft tissue restrictions, analyze and cue movement patterns, analyze and modify body mechanics/ergonomics, assess and modify postural abnormalities, address imbalance/dizziness and instruct in home and community integration to attain remaining goals. []  See Plan of Care  [x]  See progress note/recertification (3/80/09)  []  See Discharge Summary         Progress towards goals / Updated goals:  1. Patient will be (I) with progression of HEP in preparation for D/C. -Goal progressing; patient reporting compliance with HAL and SL bridges (2/27/17)  2. Patient will demo 4+/5 hip ABD strength without substitutions to improve stability for running. -Quick onset of fatigue with clam II (2/27/17)  3. Patient will report 80% improvement in symptoms indicating significant inc in QOL.     PLAN  [x]  Upgrade activities as tolerated     [x]  Continue plan of care  []  Update interventions per flow sheet       []  Discharge due to:_  [x]  Other: assess response to treatment/windlass taping/external arch support; progressed core strengthening and consider reinitiating half-kneeling barry Wu, ANJU 3/3/2017

## 2017-03-06 ENCOUNTER — HOSPITAL ENCOUNTER (OUTPATIENT)
Dept: PHYSICAL THERAPY | Age: 42
Discharge: HOME | End: 2017-03-06
Payer: OTHER GOVERNMENT

## 2017-03-06 PROCEDURE — 97140 MANUAL THERAPY 1/> REGIONS: CPT

## 2017-03-06 PROCEDURE — 97110 THERAPEUTIC EXERCISES: CPT

## 2017-03-10 ENCOUNTER — HOSPITAL ENCOUNTER (OUTPATIENT)
Dept: PHYSICAL THERAPY | Age: 42
Discharge: HOME | End: 2017-03-10
Payer: OTHER GOVERNMENT

## 2017-03-10 PROCEDURE — 97110 THERAPEUTIC EXERCISES: CPT

## 2017-03-10 PROCEDURE — 97140 MANUAL THERAPY 1/> REGIONS: CPT

## 2017-03-10 NOTE — PROGRESS NOTES
PT DAILY TREATMENT NOTE     Patient Name: Lashawn Rojas  Date:3/10/2017  : 1975  [x]  Patient  Verified  Payor:  / Plan: [address] [facility] [address] AND DEPENDENTS / Product Type:  /    In time:10:01  Out time:10:57  Total Treatment Time (min): 56  Total Timed Codes (min): 56  1:1 Treatment Time (min): 56   Visit #: 4 of     Treatment Area: Acute back pain [M54.9]    SUBJECTIVE  Pain Level (0-10 scale): 0  Any medication changes, allergies to medications, adverse drug reactions, diagnosis change, or new procedure performed?: [x] No    [] Yes (see summary sheet for update)  Subjective functional status/changes:   [] No changes reported  The calf tightens up through the day. The foot and calf are sore and tired. OBJECTIVE    41 min Therapeutic Exercise:  [x] See flow sheet :increased clams to red; billed 2 units per break in session    Rationale: increase ROM, increase strength, improve coordination and improve balance to improve the patients ability to improve return to run function     15 min Manual Therapy:  DTM to L gastroc/soleus and PF, foot intrinsics ; TCJ manips and mobilizations to improve ankle ROM for CKC mobility and running    Rationale: decrease pain, increase ROM, increase tissue extensibility and decrease trigger points to improve standing/gait, running           x min Patient Education: [x] Review HEP    [] Progressed/Changed HEP based on:   [] positioning   [] body mechanics   [x] transfers   [] heat/ice application        Other Objective/Functional Measures:     Pain Level (0-10 scale) post treatment: 0    ASSESSMENT/Changes in Function: Pt progressing well with hip strength and stability in stance for barre and return to running. Limited by c/o PF pain in the morning and at the end of the day.  Will benefit from custom orthotics on Monday to improve mechanics in [address]     Patient will continue to benefit from skilled PT services to modify and progress therapeutic interventions, address functional mobility deficits, address ROM deficits, address strength deficits, analyze and address soft tissue restrictions, analyze and cue movement patterns, analyze and modify body mechanics/ergonomics, assess and modify postural abnormalities and instruct in home and community integration to attain remaining goals. []  See Plan of Care  []  See progress note/recertification  []  See Discharge Summary         Progress towards goals / Updated goals:  1. Patient will be (I) with progression of HEP in preparation for D/C. -Goal progressing; patient reporting compliance with HAL and SL bridges (2/27/17)  2. Patient will demo 4+/5 hip ABD strength without substitutions to improve stability for running. Slower onset of fatigue with clam II; progressed to red (3/10/17); hip drop with R SLS pallof press (3/6/17)  3. Patient will report 80% improvement in symptoms indicating significant inc in QOL. Progressing with improvements in SIJ, LBP and hips noted but chief c/o (L) heel and PF pain limiting return to running (3/10/17)    PLAN  [x]  Upgrade activities as tolerated     []  Continue plan of care  []  Update interventions per flow sheet       []  Discharge due to:_  [x]  Other:_  Pt to have orthotics made Monday. Assess response.       Sheryl Lopez, PT 3/10/2017  6:35 AM

## 2017-03-13 ENCOUNTER — HOSPITAL ENCOUNTER (OUTPATIENT)
Dept: PHYSICAL THERAPY | Age: 42
Discharge: HOME | End: 2017-03-13
Payer: OTHER GOVERNMENT

## 2017-03-13 ENCOUNTER — APPOINTMENT (OUTPATIENT)
Dept: PHYSICAL THERAPY | Age: 42
End: 2017-03-13
Payer: OTHER GOVERNMENT

## 2017-03-13 PROCEDURE — 97760 ORTHOTIC MGMT&TRAING 1ST ENC: CPT

## 2017-03-13 NOTE — PROGRESS NOTES
PHYSICAL THERAPY - DAILY TREATMENT NOTE    Patient Name: Tonya Alba        Date: 3/13/2017  : 1975   YES Patient  Verified  Visit #:   1   of   2  Insurance: Payor:  / Plan: [address] [facility] [address] AND DEPENDENTS / Product Type:  /      In time: 3:25 Out time: 4:05   Total Treatment Time: 40     Medicare Time Tracking (below)   Total Timed Codes (min):  NA 1:1 Treatment Time:  NA     TREATMENT AREA =  Left heel    SUBJECTIVE    Pain Level (on 0 to 10 scale):  0.5  / 10   Medication Changes/New allergies or changes in medical history, any new surgeries or procedures? NO    If yes, update Summary List   Subjective Functional Status/Changes:  []  No changes reported     See POC          OBJECTIVE      Other Objective/Functional Measures:    Shown and performed HEP; foam roller leslie/sol, stnd leslie/sol stretch. Orthotic fitting x 40min mngt/train : assessed and fit for bilat semicustom heat molded Vasyli orthotics, medium density, bilat rearfoot 4deg varus post.  Good initial fit in current stability shoes. Post Treatment Pain Level (on 0 to 10) scale:   0.5 / 10     ASSESSMENT  Assessment/Changes in Function:     See POC     []  See Progress Note/Recertification   Patient will continue to benefit from skilled PT services to modify and progress therapeutic interventions, address ROM deficits, analyze and address soft tissue restrictions and analyze and cue movement patterns to attain goals per POC.    Progress toward goals / Updated goals:    See POC     PLAN  [x]  Upgrade activities as tolerated YES Continue plan of care   []  Discharge due to :    []  Other: Further assess heel cord flexibility     Therapist: Venus Soto PT, DPT, OCS, CSCS    Date: 3/13/2017 Time: 3:14 PM

## 2017-03-13 NOTE — PROGRESS NOTES
[facility]  [address], [address], [facility], [address] [place] [address] - Phone: [phone]  Fax: [phone] / [address]  Patient Name: Dayton Cha : 1975   Medical   Diagnosis: Heel pain Treatment Diagnosis: Foot pain [M79.673]   Onset Date: 2016     Referral Source: Earvin Primrose, MD [facility]): 3/13/2017   Prior Hospitalization: See medical history Provider #: [id number]   Prior Level of Function: Previous no pain with running, walking or rising from sitting   Comorbidities: arthritis   Medications: Verified on Patient Summary List   The Plan of Care and following information is based on the information from the initial evaluation.   ===========================================================================================  Assessment / hameed information:  Dayton Cha is a 46 y.o.  yo female with Dx: left heel pain, who reports pain in left heel initially in  after an intense workout and then 5hour car ride. Pt rates pain as 8/10 max, 0/10 at best, 0.5/10 today located at left heel mainly but up to left hip at times. Pain with attempts at running next day, less with wearing heels. Currently being treated for lower back pain, doing some work on calf and plantar fascia. Objective: FOTO score = 63 (an established functional score where 100 = no disability). Pt tender to palpation at medial gastroc left, tender but feels good with palpation at plantar fascia left foot. DF AAROM knee ext left 8, right 14; knee flexed left 16, right 19. Gait shows no sig deviations. Midfoot mobility normal bilat; first ray PF bilat, first ray mobility normal bilat. 90/90 HS stretch <10 deg bilat. Video run analyis on TM with shoes shows no sig deviations. Fit for bilat semicustom heat molded orthotics, good initial fit.   Pt instructed in HEP and will f/u in clinic for PT.  ===========================================================================================  Eval Complexity: History MEDIUM  Complexity : 1-2 comorbidities / personal factors will impact the outcome/ POC ;  Examination  MEDIUM Complexity : 3 Standardized tests and measures addressing body structure, function, activity limitation and / or participation in recreation ; Presentation LOW Complexity : Stable, uncomplicated ;  Decision Making MEDIUM Complexity : FOTO score of 26-74; Overall Complexity LOW   Problem List: pain affecting function, decrease ROM, impaired gait/ balance, decrease ADL/ functional abilitiies, decrease activity tolerance and decrease flexibility/ joint mobility FOTO = 63  Treatment Plan may include any combination of the following: Therapeutic exercise, Patient education and Self Care training  Patient / Family readiness to learn indicated by: asking questions, trying to perform skills and interest  Persons(s) to be included in education: patient (P)  Barriers to Learning/Limitations: no  Measures taken:    Patient Goal (s): Pain free, orthotic   Patient self reported health status: good  Rehabilitation Potential: good   Short Term Goals: To be accomplished in  1-2  weeks:  1. Will f/u for orthotic pickup and have good intitial fit. 2. Will f/u prn for adjustments of orthotics. Frequency / Duration:   Patient to be seen  1-2 visits:  Patient / Caregiver education and instruction: self care, HEP, orthotic instructions. G-Codes (GP): SAYRA  Therapist Signature: Reginald Restrepo PT, DPT, OCS, CSCS Date: 6/50/8310   Certification Period: NA Time: 4:03 PM   ===========================================================================================  I certify that the above Physical Therapy Services are being furnished while the patient is under my care. I agree with the treatment plan and certify that this therapy is necessary.     Physician Signature:        Date:       Time:     Please sign and return to [facility] [place] or you may fax the signed copy to [phone]. Thank you.

## 2017-03-15 NOTE — PROGRESS NOTES
Request for use of Dry Needling/Intramuscular Manual Therapy  Patient: Salbador Trujillo     Referral Source: Laquita Gardner MD  Diagnosis: Acute back pain [M54.9]      : 1975  Date of initial visit: 17   Attended visits: 14  Missed Visits: 0    Based on findings from the physical therapy examination and evaluation, the evaluating therapist believes the patient, Salbador Trujillo  would benefit from including Dry Needling as part of the plan of care. Dry needling is a treatment technique utilized in conjunction with other PT interventions to inactivate myofascial trigger points and the pain and dysfunction they cause. Dry Needling is an advanced procedure that requires additional training including greater than 54 hours of intensive course work. Physical Therapists at [address] [place] [address] are certified through [address] [place] [address] courses for their education and are certified to perform treatments. PROCEDURE:   Solid filament sterile needle (typically 0.3mm/30 gauge) inserted into a trigger point   Repeated movements inactivate the trigger points, taking 30-60 seconds per site   Typically consists of 1 dry needling session per week and a possible second treatment including muscle re-education, flexibility, strengthening and other manual techniques to facilitate the benefits of dry needling     BENEFITS:   Inactivation of trigger points   Decreased pain   Increased muscle length   Improved movement patterns   Restoration of function POTENTIAL RISKS:   Post-needling soreness   Infection   Bruising/bleeding   Penetration of a nerve   Pneumothorax   All treating PTs have been thoroughly educated in avoiding adverse reactions    If you agree with this recommendation, please sign this form and fax it to us at [phone].   If you have questions or concerns regarding dry needling or any other treatment we may be providing, please contact us at [phone]    Thank you for allowing us to assist in the care of your patient. Daniela Freedman, PT    3/15/2017 10:08 AM     NOTE TO PHYSICIAN:  PLEASE COMPLETE THE ORDERS BELOW AND   FAX TO [facility]: [phone]  If you are unable to process this request in 24 hours please contact our office:   [phone] I have read the above request and AGREE to the recommendation of including dry needling as part of the plan of care. ? I have read the above request and DO NOT AGREE to including dry needling as part of the plan of care.   ? I have read the above report and request that my patient continue therapy with the following changes/special instructions:  ________________________________________________________________________    Physicians signature: _______________________________________________     Date: ______________Time:_______________

## 2017-03-16 NOTE — PROGRESS NOTES
PT DAILY TREATMENT NOTE     Patient Name: Laith Hutton  Date:3/16/2017  : 1975  [x]  Patient  Verified  Payor:  / Plan: [employer] DEPENDENTS / Product Type: Mary Ellen Geronimo /    In time:930  Out time:1027  Total Treatment Time (min): 62  Visit #: 5 of     Treatment Area: Acute back pain [M54.9]    SUBJECTIVE  Pain Level (0-10 scale): 0  Any medication changes, allergies to medications, adverse drug reactions, diagnosis change, or new procedure performed?: [x] No    [] Yes (see summary sheet for update)  Subjective functional status/changes:   [] No changes reported  \"I feel great. I think yoga is helping. \"    OBJECTIVE    45 min Therapeutic Exercise:  [x] See flow sheet :   Rationale: increase ROM, increase strength, improve coordination and improve balance to improve the patients ability to improve return to run function     12 min Manual Therapy: DTM gastroc and soleus, manual gastroc and soleus stretch , prone TC mobs,sacral float    Rationale: decrease pain, increase ROM, increase tissue extensibility and decrease trigger points to improve standing/gait, running           x min Patient Education: [x] Review HEP       Other Objective/Functional Measures:    IMT held sec to improvements in pain     Progressed quad to add RTB for core stability   LTG 3 assessed       Pain Level (0-10 scale) post treatment: 0    ASSESSMENT/Changes in Function: Pt reports custom orthotic is going well, but she hasn't had to wear them much because of work. Patient is now stretch calf x 2 per day and doing yoga. She feels yoga is helping. Held IMT today sec to low pain levels and reports of progression. Good balance demo with SLS ball toss with no LOB.      Patient will continue to benefit from skilled PT services to modify and progress therapeutic interventions, address functional mobility deficits, address ROM deficits, address strength deficits, analyze and address soft tissue restrictions, analyze and cue movement patterns, analyze and modify body mechanics/ergonomics, assess and modify postural abnormalities and instruct in home and community integration to attain remaining goals. []  See Plan of Care  [x]  See progress note/recertification 8/31/40  []  See Discharge Summary         Progress towards goals / Updated goals:  1. Patient will be (I) with progression of HEP in preparation for D/C. -Goal progressing; patient reporting compliance with HAL and SL bridges (2/27/17)  2. Patient will demo 4+/5 hip ABD strength without substitutions to improve stability for running. Slower onset of fatigue with clam II; progressed to red (3/10/17); hip drop with R SLS pallof press (3/6/17)  3. Patient will report 80% improvement in symptoms indicating significant inc in QOL.  Progressing with decreased pain in am and overall reported 3/16/17    PLAN  [x]  Upgrade activities as tolerated     []  Continue plan of care  []  Update interventions per flow sheet       []  Discharge due to:_  [x]  Other:_ assess need for IMT NV   Progress core in standing or 1/2 kneeling     Adeola Rodriguez, PT 3/16/2017  6:35 AM

## 2017-03-17 ENCOUNTER — HOSPITAL ENCOUNTER (OUTPATIENT)
Dept: PHYSICAL THERAPY | Age: 42
Discharge: HOME | End: 2017-03-17
Payer: OTHER GOVERNMENT

## 2017-03-17 PROCEDURE — 97140 MANUAL THERAPY 1/> REGIONS: CPT

## 2017-03-17 PROCEDURE — 97110 THERAPEUTIC EXERCISES: CPT

## 2017-03-20 ENCOUNTER — HOSPITAL ENCOUNTER (OUTPATIENT)
Dept: PHYSICAL THERAPY | Age: 42
Discharge: HOME | End: 2017-03-20
Payer: OTHER GOVERNMENT

## 2017-03-20 PROCEDURE — 97140 MANUAL THERAPY 1/> REGIONS: CPT

## 2017-03-20 PROCEDURE — 97110 THERAPEUTIC EXERCISES: CPT

## 2017-03-20 NOTE — PROGRESS NOTES
[address] [facility] [address] [facility] AT [address]. [facility] [address]  Phone: [phone] Fax: [phone]  PROGRESS NOTE  Patient Name: Tonya Alba : 1975   Treatment/Medical Diagnosis: Acute back pain [M54.9]   Referral Source: Eneida Jade MD     Date of Initial Visit: 17 Attended Visits: 16 Missed Visits: 0     SUMMARY OF TREATMENT  Pt is a 46y.o. year old female who presents with co L sided SIJ and glut pain starting 2016 after intense [place] workout followed by long car drive (5 hours). Treatment has consisted of following: Therapeutic exercise, Neuromuscular re-education, Physical agent/modality, Gait/balance training, Manual therapy, Patient education; custom orthotics     CURRENT STATUS  Patient has made good progress since initiating therapy. Assessment as follows:  Improvements: No SIJ c/o;  Normal ambulation, no pain with standing, no morning foot pain   Deficits: running sec (L) foot pain, min L glute med weakness  FOTO score: 84/100 (at last assessment, 68/100)   (-) pelvic obliquity  Running analysis: mild valgus collapse during (L) stance phase; addressing with TE and orthotics   L/S AROM: flex fingertips to toes; WNL in all directions  Hip strength: L glute med 4+/5, all else 5/5  Core strength: 100% bridge  Pain: (A) 0    Goal/Measure of Progress   1. Patient will be (I) with progression of HEP in preparation for D/C. -Goal progressing; patient reporting compliance with progression (3/20/17)  2. Patient will demo 4+/5 hip ABD strength without substitutions to improve stability for running. 4+5; goal met. hip drop with R SLS pallof press (3/20/17)  3. Patient will report 80% improvement in symptoms indicating significant inc in QOL. 90% improvement (3/20/17)     New Goals to be achieved in __8__  treatments:  1.   Patient will be (I) with progression of HEP in preparation for D/C.  2.  Pt will be able to perform 3x30\" jumping (B) in place, forward/back and side to side as well as single leg jumping (as noted before) 3x10\" each to demo a progression to return to run  3. Pt will be able to run on TM or outside for 5 minutes with functional gait and no foot or SIJ pain to return to PLOF     RECOMMENDATIONS  Pt to be seen 1x in 2 weeks and then 1x two weeks following if needed to progress to safe, pain-free return to running. If you have any questions/comments please contact us directly at [phone]. Thank you for allowing us to assist in the care of your patient. PT Signature: Adilia Johnson PT  Date: 3/20/2017     Time: 3:41 PM   NOTE TO PHYSICIAN:  PLEASE COMPLETE THE ORDERS BELOW AND FAX TO   [facility]: [phone]. If you are unable to process this request in 24 hours please contact our office: [phone].  ___ I have read the above report and request that my patient continue as recommended.   ___ I have read the above report and request that my patient continue therapy with the following changes/special instructions:_________________________________________________________   ___ I have read the above report and request that my patient be discharged from therapy.      Physician Signature:        Date:       Time:

## 2017-03-20 NOTE — PROGRESS NOTES
PT DAILY TREATMENT NOTE     Patient Name: Lg Carter  Date:3/20/2017  : 1975  [x]  Patient  Verified  Payor:  / Plan: [employer] [address] AND DEPENDENTS / Product Type: Urbano Le /    In time:9:30  Out time:10:22  Total Treatment Time (min): 52  Total Timed Codes (min): 52  1:1 Treatment Time (min): 52   Visit #: 6 of     Treatment Area: Acute back pain [M54.9]    SUBJECTIVE  Pain Level (0-10 scale): 0  Any medication changes, allergies to medications, adverse drug reactions, diagnosis change, or new procedure performed?: [x] No    [] Yes (see summary sheet for update)  Subjective functional status/changes:   [] No changes reported  I can get out of bed without pain in the mornings now. The back has been great. Yoga 2x/week   No pain with walking at all recently. 90% improvement  Orthotics feel great after wearing them this weekend. OBJECTIVE    42 min Therapeutic Exercise:  [x] See flow sheet : reassessment    Rationale: increase ROM, increase strength, improve coordination and improve balance to improve the patients ability to improve running, return to PLOF     10 min Manual Therapy:  DTM to Medial > Lateral soleus, gastroc. Gastroc/soleus stretch. TrP release    Rationale: increase ROM, increase tissue extensibility and decrease trigger points to ipmrove return to running           x min Patient Education: [x] Review HEP    [] Progressed/Changed HEP based on:   Pt appropriate to decrease to being seen 1x in 2 weeks and 1 time two weeks later if needed. 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 Advised her to not start running at this time due to glute med/max fatigue from barre classes. Pt to resume jumping program which was started approx 1 month ago. Avoid pain. Initiate 3x30\" (B) jumps in place, forward/back and side to side.  If no pain, con't for several days and then progress to SL jumping with no pain. BUild up and will give instructions on return to run program in our next session(s)       Other Objective/Functional Measures:   L ankle DF AROm/PROM (knee straight) 4/7 , knee bent 12/15  MMT hips:  Glute med (L) 4+/5, all else 5/5  Bridge: neutral  Alignment neutral       Pain Level (0-10 scale) post treatment: 0    ASSESSMENT/Changes in Function: see PN     Patient will continue to benefit from skilled PT services to modify and progress therapeutic interventions, address functional mobility deficits, address ROM deficits, address strength deficits, analyze and address soft tissue restrictions, analyze and cue movement patterns and analyze and modify body mechanics/ergonomics to attain remaining goals. []  See Plan of Care  []  See progress note/recertification  []  See Discharge Summary         Progress towards goals / Updated goals:  1. Patient will be (I) with progression of HEP in preparation for D/C. -Goal progressing; patient reporting compliance with progression (3/20/17)  2. Patient will demo 4+/5 hip ABD strength without substitutions to improve stability for running. 4+5; goal met. hip drop with R SLS pallof press (3/20/17)  3. Patient will report 80% improvement in symptoms indicating significant inc in QOL.  90% improvement (3/20/17)     PLAN  [x]  Upgrade activities as tolerated     []  Continue plan of care  []  Update interventions per flow sheet       []  Discharge due to:_  [x]  Other:_  1 session in 2 weeks; 1 sessions 2 weeks after that if needed to progress safe and pain-free return to running    Connor Vernon, PT 3/20/2017  9:44 AM

## 2017-03-21 ENCOUNTER — HOSPITAL ENCOUNTER (OUTPATIENT)
Dept: LAB | Age: 42
Discharge: HOME | End: 2017-03-21
Payer: OTHER GOVERNMENT

## 2017-03-21 ENCOUNTER — TELEPHONE (OUTPATIENT)
Dept: INTERNAL MEDICINE CLINIC | Age: 42
End: 2017-03-21

## 2017-03-21 ENCOUNTER — OFFICE VISIT (OUTPATIENT)
Dept: INTERNAL MEDICINE CLINIC | Age: 42
End: 2017-03-21

## 2017-03-21 VITALS
HEART RATE: 72 BPM | SYSTOLIC BLOOD PRESSURE: 101 MMHG | DIASTOLIC BLOOD PRESSURE: 68 MMHG | BODY MASS INDEX: 24.46 KG/M2 | OXYGEN SATURATION: 100 % | RESPIRATION RATE: 16 BRPM | WEIGHT: 152.2 LBS | TEMPERATURE: 98 F | HEIGHT: 66 IN

## 2017-03-21 DIAGNOSIS — Z00.00 ROUTINE GENERAL MEDICAL EXAMINATION AT A HEALTH CARE FACILITY: Primary | ICD-10-CM

## 2017-03-21 DIAGNOSIS — Z80.3 FAMILY HISTORY OF BREAST CANCER: ICD-10-CM

## 2017-03-21 DIAGNOSIS — Z13.21 ENCOUNTER FOR VITAMIN DEFICIENCY SCREENING: ICD-10-CM

## 2017-03-21 DIAGNOSIS — Z00.00 ROUTINE GENERAL MEDICAL EXAMINATION AT A HEALTH CARE FACILITY: ICD-10-CM

## 2017-03-21 DIAGNOSIS — R82.90 ABNORMAL URINE: Primary | ICD-10-CM

## 2017-03-21 LAB
25(OH)D3 SERPL-MCNC: 29.1 NG/ML (ref 30–100)
ALBUMIN SERPL BCP-MCNC: 4.4 G/DL (ref 3.4–5)
ALBUMIN/GLOB SERPL: 1.4 {RATIO} (ref 0.8–1.7)
ALP SERPL-CCNC: 49 U/L (ref 45–117)
ALT SERPL-CCNC: 25 U/L (ref 13–56)
ANION GAP BLD CALC-SCNC: 10 MMOL/L (ref 3–18)
APPEARANCE UR: CLEAR
AST SERPL W P-5'-P-CCNC: 15 U/L (ref 15–37)
BACTERIA URNS QL MICRO: ABNORMAL /HPF
BILIRUB SERPL-MCNC: 0.5 MG/DL (ref 0.2–1)
BILIRUB UR QL: NEGATIVE
BUN SERPL-MCNC: 15 MG/DL (ref 7–18)
BUN/CREAT SERPL: 19 (ref 12–20)
CALCIUM SERPL-MCNC: 9 MG/DL (ref 8.5–10.1)
CHLORIDE SERPL-SCNC: 102 MMOL/L (ref 100–108)
CHOLEST SERPL-MCNC: 196 MG/DL
CO2 SERPL-SCNC: 26 MMOL/L (ref 21–32)
COLOR UR: YELLOW
CREAT SERPL-MCNC: 0.81 MG/DL (ref 0.6–1.3)
EPITH CASTS URNS QL MICRO: ABNORMAL /LPF (ref 0–5)
ERYTHROCYTE [DISTWIDTH] IN BLOOD BY AUTOMATED COUNT: 13.2 % (ref 11.6–14.5)
GLOBULIN SER CALC-MCNC: 3.1 G/DL (ref 2–4)
GLUCOSE SERPL-MCNC: 77 MG/DL (ref 74–99)
GLUCOSE UR STRIP.AUTO-MCNC: NEGATIVE MG/DL
HCT VFR BLD AUTO: 43.4 % (ref 35–45)
HDLC SERPL-MCNC: 79 MG/DL (ref 40–60)
HDLC SERPL: 2.5 {RATIO} (ref 0–5)
HGB BLD-MCNC: 14.8 G/DL (ref 12–16)
HGB UR QL STRIP: NEGATIVE
KETONES UR QL STRIP.AUTO: NEGATIVE MG/DL
LDLC SERPL CALC-MCNC: 102.2 MG/DL (ref 0–100)
LEUKOCYTE ESTERASE UR QL STRIP.AUTO: ABNORMAL
LIPID PROFILE,FLP: ABNORMAL
MCH RBC QN AUTO: 31.6 PG (ref 24–34)
MCHC RBC AUTO-ENTMCNC: 34.1 G/DL (ref 31–37)
MCV RBC AUTO: 92.7 FL (ref 74–97)
NITRITE UR QL STRIP.AUTO: NEGATIVE
PH UR STRIP: 7.5 [PH] (ref 5–8)
PLATELET # BLD AUTO: 191 K/UL (ref 135–420)
PMV BLD AUTO: 11.5 FL (ref 9.2–11.8)
POTASSIUM SERPL-SCNC: 3.7 MMOL/L (ref 3.5–5.5)
PROT SERPL-MCNC: 7.5 G/DL (ref 6.4–8.2)
PROT UR STRIP-MCNC: NEGATIVE MG/DL
RBC # BLD AUTO: 4.68 M/UL (ref 4.2–5.3)
RBC #/AREA URNS HPF: 0 /HPF (ref 0–5)
SODIUM SERPL-SCNC: 138 MMOL/L (ref 136–145)
SP GR UR REFRACTOMETRY: 1.01 (ref 1–1.03)
TRIGL SERPL-MCNC: 74 MG/DL (ref ?–150)
TSH SERPL DL<=0.05 MIU/L-ACNC: 3.12 UIU/ML (ref 0.36–3.74)
UROBILINOGEN UR QL STRIP.AUTO: 0.2 EU/DL (ref 0.2–1)
VLDLC SERPL CALC-MCNC: 14.8 MG/DL
WBC # BLD AUTO: 6 K/UL (ref 4.6–13.2)
WBC URNS QL MICRO: ABNORMAL /HPF (ref 0–4)

## 2017-03-21 PROCEDURE — 82306 VITAMIN D 25 HYDROXY: CPT | Performed by: INTERNAL MEDICINE

## 2017-03-21 PROCEDURE — 36415 COLL VENOUS BLD VENIPUNCTURE: CPT | Performed by: INTERNAL MEDICINE

## 2017-03-21 PROCEDURE — 73060999999 HC MISC LAB CHARGE

## 2017-03-21 PROCEDURE — 80053 COMPREHEN METABOLIC PANEL: CPT | Performed by: INTERNAL MEDICINE

## 2017-03-21 PROCEDURE — 81162 BRCA1&2 GEN FULL SEQ DUP/DEL: CPT

## 2017-03-21 PROCEDURE — 81001 URINALYSIS AUTO W/SCOPE: CPT | Performed by: INTERNAL MEDICINE

## 2017-03-21 PROCEDURE — 80061 LIPID PANEL: CPT | Performed by: INTERNAL MEDICINE

## 2017-03-21 PROCEDURE — 85027 COMPLETE CBC AUTOMATED: CPT | Performed by: INTERNAL MEDICINE

## 2017-03-21 PROCEDURE — 84443 ASSAY THYROID STIM HORMONE: CPT | Performed by: INTERNAL MEDICINE

## 2017-03-21 NOTE — TELEPHONE ENCOUNTER
Patient would like to confirm that if her insurance does not cover the Breast Cancer Gene testing, she will be contacted before test is performed.

## 2017-03-21 NOTE — MR AVS SNAPSHOT
Visit Information Date & Time Provider Department Dept. Phone Encounter #  
 3/21/2017  8:00 AM Linda Hernandez MD [facility] [phone] [id number] Follow-up Instructions Return in about 1 year (around 3/21/2018) for CPE. Upcoming Health Maintenance Date Due  
 PAP AKA CERVICAL CYTOLOGY 2/18/2018 DTaP/Tdap/Td series (2 - Td) 11/21/2022 Allergies as of 3/21/2017  Review Complete On: 3/21/2017 By: Linda Hernandez MD  
 No Known Allergies Current Immunizations  Reviewed on 3/21/2017 Name Date Influenza Vaccine (Quad) PF 10/20/2015 Influenza Vaccine PF 10/8/2014 10:54 AM  
  
 Reviewed by Linda Hernandez MD on 3/21/2017 at  8:04 AM  
You Were Diagnosed With   
  
 Codes Comments Routine general medical examination at a health care facility    -  Primary ICD-10-CM: Z00.00 ICD-9-CM: V70.0 Family history of breast cancer     ICD-10-CM: Z80.3 ICD-9-CM: V16.3 Encounter for vitamin deficiency screening     ICD-10-CM: Z13.21 ICD-9-CM: V77.99 Vitals BP Pulse Temp Resp Height(growth percentile) Weight(growth percentile) 101/68 (BP 1 Location: Right arm, BP Patient Position: Sitting) 72 98 °F (36.7 °C) (Oral) 16 5' 6\" (1.676 m) 152 lb 3.2 oz (69 kg) LMP SpO2 BMI OB Status Smoking Status 03/02/2017 100% 24.57 kg/m2 Having regular periods Never Smoker Vitals History BMI and BSA Data Body Mass Index Body Surface Area 24.57 kg/m 2 1.79 m 2 Preferred Pharmacy Pharmacy Name Phone [facility] [address] [place] [address] [address] [phone] Your Updated Medication List  
  
   
This list is accurate as of: 3/21/17  8:13 AM.  Always use your most recent med list.  
  
  
  
  
 CALCIUM + D PO Take  by mouth. Take 600mg/400 international units  Take  one po daily FISH OIL PO Take 1 tablet by mouth daily.  1000 mg  
  
 M-VIT PO  
 Take  by mouth daily. Take one po daily Follow-up Instructions Return in about 1 year (around 3/21/2018) for CPE. To-Do List   
 03/21/2017 Lab:  CBC W/O DIFF   
  
 03/21/2017 Lab:  LIPID PANEL   
  
 03/21/2017 Lab:  METABOLIC PANEL, COMPREHENSIVE   
  
 03/21/2017 Lab:  MISC. LAB TEST   
  
 03/21/2017 Lab:  TSH 3RD GENERATION   
  
 03/21/2017 Lab:  URINALYSIS W/ RFLX MICROSCOPIC   
  
 03/21/2017 Lab:  VITAMIN D, 25 HYDROXY   
  
 04/03/2017 10:30 AM  
  Appointment with Anirudh Zaldivar PTA at [facility] ([phone]) 04/17/2017 10:30 AM  
  Appointment with Anirudh Zaldivar PTA at [facility] ([phone])  
  
 03/21/2018 Lab:  CBC W/O DIFF   
  
 03/21/2018 Lab:  LIPID PANEL   
  
 03/21/2018 Lab:  METABOLIC PANEL, COMPREHENSIVE   
  
 03/21/2018 Lab:  TSH 3RD GENERATION   
  
 03/21/2018 Lab:  URINALYSIS W/ RFLX MICROSCOPIC   
  
 03/21/2018 Lab:  VITAMIN D, 25 HYDROXY Patient Instructions 1) follow-up in 1 year or sooner if worsening symptoms. 2) get fasting labs 1 week prior to the appt. Advance Care Planning: Care Instructions Your Care Instructions It can be hard to live with an illness that cannot be cured. But if your health is getting worse, you may want to make decisions about end-of-life care. Planning for the end of your life does not mean that you are giving up. It is a way to make sure that your wishes are met. Clearly stating your wishes can make it easier for your loved ones. Making plans while you are still able may also ease your mind and make your final days less stressful and more meaningful. Follow-up care is a key part of your treatment and safety. Be sure to make and go to all appointments, and call your doctor if you are having problems. It's also a good idea to know your test results and keep a list of the medicines you take. What can you do to plan for the end of life? · You can bring these issues up with your doctor. You do not need to wait until your doctor starts the conversation. You might start with \"I would not be willing to live with . Valentin Judd \" When you complete this sentence it helps your doctor understand your wishes. · Talk openly and honestly with your doctor. This is the best way to understand the decisions you will need to make as your health changes. Know that you can always change your mind. · Ask your doctor about commonly used life-support measures. These include tube feedings, breathing machines, and fluids given through a vein (IV). Understanding these treatments will help you decide whether you want them. · You may choose to have these life-supporting treatments for a limited time. This allows a trial period to see whether they will help you. You may also decide that you want your doctor to take only certain measures to keep you alive. It is important to spell out these conditions so that your doctor and family understand them. · Talk to your doctor about how long you are likely to live. He or she may be able to give you an idea of what usually happens with your specific illness. · Think about preparing papers that state your wishes. This way there will not be any confusion about what you want. You can change your instructions at any time. Which papers should you prepare? Advance directives are legal papers that tell doctors how you want to be cared for at the end of your life. You do not need a  to write these papers. Ask your doctor or your state health department for information on how to write your advance directives. They may have the forms for each of these types of papers. Make sure your doctor has a copy of these on file, and give a copy to a family member or close friend. · Consider a do-not-resuscitate order (DNR).  This order asks that no extra treatments be done if your heart stops or you stop breathing. Extra treatments may include electrical shock to restart your heart or a machine to breathe for you. If you decide to have a DNR order, ask your doctor to explain and write it. Place the order in your home where everyone can easily see it. · Consider a living will. A living will explains your wishes in case you are in a coma or cannot communicate. Living garcía tell doctors to use or not use treatments that would keep you alive. You must have one or two witnesses or a notary present when you sign this form. · Consider a durable power of . This allows you to name a person to make decisions about your care if you are not able to. Most people ask a close friend or family member. Talk to this person about the kinds of treatments you want and those that you do not want. Make sure this person understands your wishes. If this person is not the health care agent named in your advance directive, also talk with your health care agent. These legal papers are simple to change. Tell your doctor what you want to change, and ask him or her to make a note in your medical file. Give your family updated copies of the papers. Where can you learn more? Go to http://jeffrey-sam.info/. Enter P184 in the search box to learn more about \"Advance Care Planning: Care Instructions. \" Current as of: February 24, 2016 Content Version: 11.1 © 9284-0625 SpiritShop.[employer], Incorporated. Care instructions adapted under license by [employer] (which disclaims liability or warranty for this information). If you have questions about a medical condition or this instruction, always ask your healthcare professional. [address] any warranty or liability for your use of this information. Deciding About Life-Prolonging Treatment What is life-prolonging treatment? There are many kinds of treatment that can help you live longer. These may be needed for only a short time until your illness improves. Or you may use them over the long term to help keep you alive. Some treatments include the use of: · Medicines to slow the progress of certain diseases, such as heart disease, diabetes, cancer, AIDS, or Alzheimer's disease. · Antibiotics to treat serious infections, such as pneumonia. · Dialysis to clean your blood if your kidneys stop working. · A breathing machine to help you breathe if you can't breathe on your own. This machine pumps air into your lungs through a tube put into your throat. · A feeding tube or an intravenous (IV) line to give you food and fluids if you can't eat or drink. · Cardiopulmonary resuscitation (CPR) to try to restart your heart. The decision to receive treatments that may help you live longer is a personal one. You may want your doctor to do everything possible to keep you alive, even when your chance for recovery is small. Or you may choose to only have care to manage your pain and other symptoms. What are key points about this decision? · If there is a good chance that your illness can be cured or managed, your doctor may advise you to first try available treatments. If these don't work, then you might think about stopping treatment. · If you stop treatment, you will still receive care that focuses on pain relief and comfort. · A decision to stop treatment that keeps you alive does not have to be permanent. You can always change your mind if your health starts to improve. · Even though treatment focuses on helping you live longer, it may cause side effects that can greatly affect your quality of life. And it could affect how you spend time with your family and friends. · If you still have personal goals that you want to pursue, you may want treatment that keeps you alive long enough to reach them. Why might you choose life-prolonging treatment? · There is a good chance that your illness can be cured or managed. · You think you can manage the possible side effects of treatment. · You don't think treatment will get in the way of your quality of life. · You have personal goals that you still want to pursue and achieve. Why might you choose to stop life-prolonging treatment? · Your chance of surviving your illness is very low. · You have tried all possible treatments for your illness, but they have not helped. · You can no longer deal with the side effects of treatment. · You have already met the goals you set out to achieve in your life. Your decision Thinking about the facts and your feelings can help you make a decision that is right for you. Be sure you understand the benefits and risks of your options, and think about what else you need to do before you make the decision. Where can you learn more? Go to http://jeffrey-sam.info/. Enter Y826 in the search box to learn more about \"Deciding About Life-Prolonging Treatment. \" Current as of: February 24, 2016 Content Version: 11.1 © 5254-9454 mTraks. Care instructions adapted under license by [employer] (which disclaims liability or warranty for this information). If you have questions about a medical condition or this instruction, always ask your healthcare professional. [address] any warranty or liability for your use of this information. Humble Jones 9368 What is a living will? A living will is a legal form you use to write down the kind of care you want at the end of your life. It is used by the health professionals who will treat you if you aren't able to decide for yourself. If you put your wishes in writing, your loved ones and others will know what kind of care you want. They won't need to guess. This can ease your mind and be helpful to others. A living will is not the same as an estate or property will. An estate will explains what you want to happen with your money and property after you die. Is a living will a legal document? A living will is a legal document. Each state has its own laws about living garcía. If you move to another state, make sure that your living will is legal in the state where you now live. Or you might use a universal form that has been approved by many states. This kind of form can sometimes be completed and stored online. Your electronic copy will then be available wherever you have a connection to the Internet. In most cases, doctors will respect your wishes even if you have a form from a different state. · You don't need an  to complete a living will. But legal advice can be helpful if your state's laws are unclear, your health history is complicated, or your family can't agree on what should be in your living will. · You can change your living will at any time. Some people find that their wishes about end-of-life care change as their health changes. · In addition to making a living will, think about completing a medical power of  form. This form lets you name the person you want to make end-of-life treatment decisions for you (your \"health care agent\") if you're not able to. Many hospitals and nursing homes will give you the forms you need to complete a living will and a medical power of . · Your living will is used only if you can't make or communicate decisions for yourself anymore. If you become able to make decisions again, you can accept or refuse any treatment, no matter what you wrote in your living will. · Your state may offer an online registry. This is a place where you can store your living will online so the doctors and nurses who need to treat you can find it right away. What should you think about when creating a living will? Talk about your end-of-life wishes with your family members and your doctor. Let them know what you want. That way the people making decisions for you won't be surprised by your choices. Think about these questions as you make your living will: · Do you know enough about life support methods that might be used? If not, talk to your doctor so you know what might be done if you can't breathe on your own, your heart stops, or you're unable to swallow. · What things would you still want to be able to do after you receive life-support methods? Would you want to be able to walk? To speak? To eat on your own? To live without the help of machines? · If you have a choice, where do you want to be cared for? In your home? At a hospital or nursing home? · Do you want certain Religion practices performed if you become very ill? · If you have a choice at the end of your life, where would you prefer to die? At home? In a hospital or nursing home? Somewhere else? · Would you prefer to be buried or cremated? · Do you want your organs to be donated after you die? What should you do with your living will? · Make sure that your family members and your health care agent have copies of your living will. · Give your doctor a copy of your living will to keep in your medical record. If you have more than one doctor, make sure that each one has a copy. · You may want to put a copy of your living will where it can be easily found. Where can you learn more? Go to http://jeffrey-sam.info/. Enter J022 in the search box to learn more about \"Learning About Living Perroprasanna. \" Current as of: February 24, 2016 Content Version: 11.1 © 0812-0215 Webtab. Care instructions adapted under license by [employer] (which disclaims liability or warranty for this information).  If you have questions about a medical condition or this instruction, always ask your healthcare professional. Blaire Sifuentes, [facility] disclaims any warranty or liability for your use of this information. Learning About [address] What is a medical power of ? A medical power of  is one type of the legal forms called advance directives. It lets you decide who you want to make treatment decisions for you if you cannot speak or decide for yourself. The person you choose is called your health care agent. Another type of advance directive is a living will. It lets you write down what kinds of treatment or life support you want or do not want. What should you think about when choosing a health care agent? Choose your health care agent carefully. This person may or may not be a family member. Talk to the person before you make your final decision. Make sure he or she is comfortable with this responsibility. It's a good idea to choose someone who: · Is at least 25years old. · Knows you well and understands what makes life meaningful for you. · Understands your Adventism and moral values. · Will do what you want, not what he or she wants. · Will be able to make difficult choices at a stressful time. · Will be able to refuse or stop treatment, if that is what you would want, even if you could die. · Will be firm and confident with health professionals if needed. · Will ask questions to get necessary information. · Lives near you or agrees to travel to you if needed. Your family may help you make medical decisions while you can still be part of that process. But it is important to choose one person to be your health care agent in case you are not able to make decisions for yourself. If you don't fill out the legal form and name a health care agent, the decisions your family can make may be limited. Who will make decisions for you if you do not have a health care agent?  
If you don't have a health care agent or a living will, your family members may disagree about your medical care. And then some medical professionals who may not know you as well might have to make decisions for you. In some cases, a  makes the decisions. When you name a health care agent, it is very clear who has the power to make health decisions for you. How do you name a health care agent? You name your health care agent on a legal form. It is usually called a medical power of . Ask your hospital, state bar association, or office on aging where to find these forms. You must sign the form to make it legal. Some states require you to get the form notarized. This means that a person called a  watches you sign the form and then he or she signs the form. Some states also require that two or more witnesses sign the form. Be sure to tell your family members and doctors who your health care agent is. Keep your forms in a safe place. But make sure that your loved ones know where the forms are. This could be in your desk where you keep other important papers. Where can you learn more? Go to http://jeffrey-sam.info/. Enter [id number] in the search box to learn more about \"Learning About [address]. \" Current as of: February 24, 2016 Content Version: 11.1 © 1944-3468 WiOffer, Incorporated. Care instructions adapted under license by [employer] (which disclaims liability or warranty for this information). If you have questions about a medical condition or this instruction, always ask your healthcare professional. [place] [address] any warranty or liability for your use of this information. Advance Directives: Care Instructions Your Care Instructions An advance directive is a legal way to state your wishes at the end of your life. It tells your family and your doctor what to do if you can no longer say what you want. There are two main types of advance directives.  You can change them any time that your wishes change. · A living will tells your family and your doctor your wishes about life support and other treatment. · A medical power of  lets you name a person to make treatment decisions for you when you can't speak for yourself. This person is called a health care agent. If you do not have an advance directive, decisions about your medical care may be made by a doctor or a  who doesn't know you. It may help to think of an advance directive as a gift to the people who care for you. If you have one, they won't have to make tough decisions by themselves. Follow-up care is a key part of your treatment and safety. Be sure to make and go to all appointments, and call your doctor if you are having problems. It's also a good idea to know your test results and keep a list of the medicines you take. How can you care for yourself at home? · Discuss your wishes with your loved ones and your doctor. This way, there are no surprises. · Many states have a unique form. Or you might use a universal form that has been approved by many states. This kind of form can sometimes be completed and stored online. Your electronic copy will then be available wherever you have a connection to the Internet. In most cases, doctors will respect your wishes even if you have a form from a different state. · You don't need a  to do an advance directive. But you may want to get legal advice. · Think about these questions when you prepare an advance directive: ¨ Who do you want to make decisions about your medical care if you are not able to? Many people choose a family member, close friend, or doctor. ¨ Do you know enough about life support methods that might be used? If not, talk to your doctor so you understand. ¨ What are you most afraid of that might happen? You might be afraid of having pain, losing your independence, or being kept alive by machines. ¨ Where would you prefer to die? Choices include your home, a hospital, or a nursing home. ¨ Would you like to have information about hospice care to support you and your family? ¨ Do you want to donate organs when you die? ¨ Do you want certain Pentecostal practices performed before you die? If so, put your wishes in the advance directive. · Read your advance directive every year, and make changes as needed. When should you call for help? Be sure to contact your doctor if you have any questions. Where can you learn more? Go to http://jeffrey-sam.info/. Enter R264 in the search box to learn more about \"Advance Directives: Care Instructions. \" Current as of: February 24, 2016 Content Version: 11.1 © 2199-0579 Healthwise, Incorporated. Care instructions adapted under license by [employer] (which disclaims liability or warranty for this information). If you have questions about a medical condition or this instruction, always ask your healthcare professional. [place] [address] any warranty or liability for your use of this information. Deciding About Being on a Ventilator When You Have a Terminal Illness Your Care Instructions A ventilator is a life-support machine that helps you breathe if you can no longer breathe on your own. The machine provides oxygen to your lungs through a tube. The tube enters your mouth and goes down your throat to your lungs. Most people on ventilators have to be fed through another tube that goes into the stomach. You may feel that being on a ventilator would prevent a \"natural\" death or would keep you alive longer than necessary. Or you may feel that being on a ventilator would extend your life so you can do certain things, such as saying good-bye to loved ones. The decision about whether to be on a ventilator is a personal one. Be sure to talk it over with your doctor and loved ones. Follow-up care is a key part of your treatment and safety. Be sure to make and go to all appointments, and call your doctor if you are having problems. It's also a good idea to know your test results and keep a list of the medicines you take. Why might you want to be on a ventilator? · You think you may be able to return to your normal activities. · You need help breathing because of a short illness or a problem that is not related to your terminal illness. · You would like more time to say good-bye. · You feel that there are more benefits than risks. Why might you not want to be on a ventilator? · You have other long-term health problems. · You may not be able to return to your normal activities. · You want a calm, peaceful death. You do not want to spend the rest of your life on a ventilator. · You feel that there are more risks than benefits. When should you call for help? Be sure to contact your doctor if: 
· You want to learn more about being on a ventilator. · You change your mind about being on a ventilator. Where can you learn more? Go to http://jeffrey-sam.info/. Enter K369 in the search box to learn more about \"Deciding About Being on a Ventilator When You Have a Terminal Illness. \" Current as of: February 24, 2016 Content Version: 11.1 © 6626-2302 AmberPoint, Incorporated. Care instructions adapted under license by [employer] (which disclaims liability or warranty for this information). If you have questions about a medical condition or this instruction, always ask your healthcare professional. [place] [address] any warranty or liability for your use of this information. Introducing [facility]! Tres Smith introduces [facility] patient portal. Now you can access parts of your medical record, email your doctor's office, and request medication refills online.    
 
1. In your internet browser, go to [email or web address] [email or web address] 2. Click on the First Time User? Click Here link in the Sign In box. You will see the New Member Sign Up page. 3. Enter your MPV Access Code exactly as it appears below. You will not need to use this code after youve completed the sign-up process. If you do not sign up before the expiration date, you must request a new code. · MPV Access Code: [email or web address] Expires: 5/18/2017 10:04 AM 
 
4. Enter the last four digits of your Social Security Number (xxxx) and Date of Birth (mm/dd/yyyy) as indicated and click Submit. You will be taken to the next sign-up page. 5. Create a [employer] ID. This will be your MPV login ID and cannot be changed, so think of one that is secure and easy to remember. 6. Create a MPV password. You can change your password at any time. 7. Enter your Password Reset Question and Answer. This can be used at a later time if you forget your password. 8. Enter your e-mail address. You will receive e-mail notification when new information is available in [address] 9. Click Sign Up. You can now view and download portions of your medical record. 10. Click the Download Summary menu link to download a portable copy of your medical information. If you have questions, please visit the Frequently Asked Questions section of the MPV website. Remember, MPV is NOT to be used for urgent needs. For medical emergencies, dial 911. Now available from your iPhone and Android! Please provide this summary of care documentation to your next provider. Your primary care clinician is listed as Meghna Banks. If you have any questions after today's visit, please call [phone].

## 2017-03-21 NOTE — PATIENT INSTRUCTIONS
1) follow-up in 1 year or sooner if worsening symptoms. 2) get fasting labs 1 week prior to the appt. Advance Care Planning: Care Instructions  Your Care Instructions  It can be hard to live with an illness that cannot be cured. But if your health is getting worse, you may want to make decisions about end-of-life care. Planning for the end of your life does not mean that you are giving up. It is a way to make sure that your wishes are met. Clearly stating your wishes can make it easier for your loved ones. Making plans while you are still able may also ease your mind and make your final days less stressful and more meaningful. Follow-up care is a key part of your treatment and safety. Be sure to make and go to all appointments, and call your doctor if you are having problems. It's also a good idea to know your test results and keep a list of the medicines you take. What can you do to plan for the end of life? · You can bring these issues up with your doctor. You do not need to wait until your doctor starts the conversation. You might start with \"I would not be willing to live with . Meir Pickerel Meir Pickerel Meir Pickerel \" When you complete this sentence it helps your doctor understand your wishes. · Talk openly and honestly with your doctor. This is the best way to understand the decisions you will need to make as your health changes. Know that you can always change your mind. · Ask your doctor about commonly used life-support measures. These include tube feedings, breathing machines, and fluids given through a vein (IV). Understanding these treatments will help you decide whether you want them. · You may choose to have these life-supporting treatments for a limited time. This allows a trial period to see whether they will help you. You may also decide that you want your doctor to take only certain measures to keep you alive. It is important to spell out these conditions so that your doctor and family understand them.   · Talk to your doctor about how long you are likely to live. He or she may be able to give you an idea of what usually happens with your specific illness. · Think about preparing papers that state your wishes. This way there will not be any confusion about what you want. You can change your instructions at any time. Which papers should you prepare? Advance directives are legal papers that tell doctors how you want to be cared for at the end of your life. You do not need a  to write these papers. Ask your doctor or your state [facility] department for information on how to write your advance directives. They may have the forms for each of these types of papers. Make sure your doctor has a copy of these on file, and give a copy to a family member or close friend. · Consider a do-not-resuscitate order (DNR). This order asks that no extra treatments be done if your heart stops or you stop breathing. Extra treatments may include electrical shock to restart your heart or a machine to breathe for you. If you decide to have a DNR order, ask your doctor to explain and write it. Place the order in your home where everyone can easily see it. · Consider a living will. A living will explains your wishes in case you are in a coma or cannot communicate. Living garcía tell doctors to use or not use treatments that would keep you alive. You must have one or two witnesses or a notary present when you sign this form. · Consider a durable power of . This allows you to name a person to make decisions about your care if you are not able to. Most people ask a close friend or family member. Talk to this person about the kinds of treatments you want and those that you do not want. Make sure this person understands your wishes. If this person is not the health care agent named in your advance directive, also talk with your health care agent. These legal papers are simple to change.  Tell your doctor what you want to change, and ask him or her to make a note in your medical file. Give your family updated copies of the papers. Where can you learn more? Go to http://jeffrey-sam.info/. Enter P184 in the search box to learn more about \"Advance Care Planning: Care Instructions. \"  Current as of: February 24, 2016  Content Version: 11.1  © 5392-9295 [employer]. Care instructions adapted under license by [facility] (which disclaims liability or warranty for this information). If you have questions about a medical condition or this instruction, always ask your healthcare professional. [address] any warranty or liability for your use of this information. Deciding About Life-Prolonging Treatment  What is life-prolonging treatment? There are many kinds of treatment that can help you live longer. These may be needed for only a short time until your illness improves. Or you may use them over the long term to help keep you alive. Some treatments include the use of:  · Medicines to slow the progress of certain diseases, such as heart disease, diabetes, cancer, AIDS, or Alzheimer's disease. · Antibiotics to treat serious infections, such as pneumonia. · Dialysis to clean your blood if your kidneys stop working. · A breathing machine to help you breathe if you can't breathe on your own. This machine pumps air into your lungs through a tube put into your throat. · A feeding tube or an intravenous (IV) line to give you food and fluids if you can't eat or drink. · Cardiopulmonary resuscitation (CPR) to try to restart your heart. The decision to receive treatments that may help you live longer is a personal one. You may want your doctor to do everything possible to keep you alive, even when your chance for recovery is small. Or you may choose to only have care to manage your pain and other symptoms. What are key points about this decision?   · If there is a good chance that your illness can be cured or managed, your doctor may advise you to first try available treatments. If these don't work, then you might think about stopping treatment. · If you stop treatment, you will still receive care that focuses on pain relief and comfort. · A decision to stop treatment that keeps you alive does not have to be permanent. You can always change your mind if your health starts to improve. · Even though treatment focuses on helping you live longer, it may cause side effects that can greatly affect your quality of life. And it could affect how you spend time with your family and friends. · If you still have personal goals that you want to pursue, you may want treatment that keeps you alive long enough to reach them. Why might you choose life-prolonging treatment? · There is a good chance that your illness can be cured or managed. · You think you can manage the possible side effects of treatment. · You don't think treatment will get in the way of your quality of life. · You have personal goals that you still want to pursue and achieve. Why might you choose to stop life-prolonging treatment? · Your chance of surviving your illness is very low. · You have tried all possible treatments for your illness, but they have not helped. · You can no longer deal with the side effects of treatment. · You have already met the goals you set out to achieve in your life. Your decision  Thinking about the facts and your feelings can help you make a decision that is right for you. Be sure you understand the benefits and risks of your options, and think about what else you need to do before you make the decision. Where can you learn more? Go to http://jeffrey-sam.info/. Enter K578 in the search box to learn more about \"Deciding About Life-Prolonging Treatment. \"  Current as of: February 24, 2016  Content Version: 11.1  © 0053-9830 [employer].  Care instructions adapted under license by [address] (which disclaims liability or warranty for this information). If you have questions about a medical condition or this instruction, always ask your healthcare professional. [address] any warranty or liability for your use of this information. Learning About Living Stoney Herr  What is a living will? A living will is a legal form you use to write down the kind of care you want at the end of your life. It is used by the health professionals who will treat you if you aren't able to decide for yourself. If you put your wishes in writing, your loved ones and others will know what kind of care you want. They won't need to guess. This can ease your mind and be helpful to others. A living will is not the same as an estate or property will. An estate will explains what you want to happen with your money and property after you die. Is a living will a legal document? A living will is a legal document. Each state has its own laws about living garcía. If you move to another state, make sure that your living will is legal in the state where you now live. Or you might use a universal form that has been approved by many states. This kind of form can sometimes be completed and stored online. Your electronic copy will then be available wherever you have a connection to the Internet. In most cases, doctors will respect your wishes even if you have a form from a different state. · You don't need an  to complete a living will. But legal advice can be helpful if your state's laws are unclear, your health history is complicated, or your family can't agree on what should be in your living will. · You can change your living will at any time. Some people find that their wishes about end-of-life care change as their health changes. · In addition to making a living will, think about completing a medical power of  form.  This form lets you name the person you want to make end-of-life treatment decisions for you (your \"health care agent\") if you're not able to. Many hospitals and nursing homes will give you the forms you need to complete a living will and a medical power of . · Your living will is used only if you can't make or communicate decisions for yourself anymore. If you become able to make decisions again, you can accept or refuse any treatment, no matter what you wrote in your living will. · Your state may offer an online registry. This is a place where you can store your living will online so the doctors and nurses who need to treat you can find it right away. What should you think about when creating a living will? Talk about your end-of-life wishes with your family members and your doctor. Let them know what you want. That way the people making decisions for you won't be surprised by your choices. Think about these questions as you make your living will:  · Do you know enough about life support methods that might be used? If not, talk to your doctor so you know what might be done if you can't breathe on your own, your heart stops, or you're unable to swallow. · What things would you still want to be able to do after you receive life-support methods? Would you want to be able to walk? To speak? To eat on your own? To live without the help of machines? · If you have a choice, where do you want to be cared for? In your home? At a hospital or nursing home? · Do you want certain Caodaism practices performed if you become very ill? · If you have a choice at the end of your life, where would you prefer to die? At home? In a hospital or nursing home? Somewhere else? · Would you prefer to be buried or cremated? · Do you want your organs to be donated after you die? What should you do with your living will? · Make sure that your family members and your health care agent have copies of your living will.   · Give your doctor a copy of your living will to keep in your medical record. If you have more than one doctor, make sure that each one has a copy. · You may want to put a copy of your living will where it can be easily found. Where can you learn more? Go to http://jeffrey-sam.info/. Enter Q738 in the search box to learn more about \"Learning About Living Jackeline Contreras. \"  Current as of: February 24, 2016  Content Version: 11.1  © 0463-5909 [employer]. Care instructions adapted under license by [employer] (which disclaims liability or warranty for this information). If you have questions about a medical condition or this instruction, always ask your healthcare professional. [address] any warranty or liability for your use of this information. Learning About Medical Power of   What is a medical power of ? A medical power of  is one type of the legal forms called advance directives. It lets you decide who you want to make treatment decisions for you if you cannot speak or decide for yourself. The person you choose is called your health care agent. Another type of advance directive is a living will. It lets you write down what kinds of treatment or life support you want or do not want. What should you think about when choosing a health care agent? Choose your health care agent carefully. This person may or may not be a family member. Talk to the person before you make your final decision. Make sure he or she is comfortable with this responsibility. It's a good idea to choose someone who:  · Is at least 25years old. · Knows you well and understands what makes life meaningful for you. · Understands your Moravian and moral values. · Will do what you want, not what he or she wants. · Will be able to make difficult choices at a stressful time. · Will be able to refuse or stop treatment, if that is what you would want, even if you could die.   · Will be firm and confident with health professionals if needed. · Will ask questions to get necessary information. · Lives near you or agrees to travel to you if needed. Your family may help you make medical decisions while you can still be part of that process. But it is important to choose one person to be your health care agent in case you are not able to make decisions for yourself. If you don't fill out the legal form and name a health care agent, the decisions your family can make may be limited. Who will make decisions for you if you do not have a health care agent? If you don't have a health care agent or a living will, your family members may disagree about your medical care. And then some medical professionals who may not know you as well might have to make decisions for you. In some cases, a  makes the decisions. When you name a health care agent, it is very clear who has the power to make health decisions for you. How do you name a health care agent? You name your health care agent on a legal form. It is usually called a medical power of . Ask your hospital, state bar association, or office on aging where to find these forms. You must sign the form to make it legal. Some states require you to get the form notarized. This means that a person called a  watches you sign the form and then he or she signs the form. Some states also require that two or more witnesses sign the form. Be sure to tell your family members and doctors who your health care agent is. Keep your forms in a safe place. But make sure that your loved ones know where the forms are. This could be in your desk where you keep other important papers. Where can you learn more? Go to http://jeffrey-sam.info/. Enter [id number] in the search box to learn more about \"Learning About [address]. \"  Current as of: February 24, 2016  Content Version: 11.1  © 5563-9384 [employer].  Care instructions adapted under license by [employer] (which disclaims liability or warranty for this information). If you have questions about a medical condition or this instruction, always ask your healthcare professional. [address] any warranty or liability for your use of this information. Advance Directives: Care Instructions  Your Care Instructions  An advance directive is a legal way to state your wishes at the end of your life. It tells your family and your doctor what to do if you can no longer say what you want. There are two main types of advance directives. You can change them any time that your wishes change. · A living will tells your family and your doctor your wishes about life support and other treatment. · A medical power of  lets you name a person to make treatment decisions for you when you can't speak for yourself. This person is called a health care agent. If you do not have an advance directive, decisions about your medical care may be made by a doctor or a  who doesn't know you. It may help to think of an advance directive as a gift to the people who care for you. If you have one, they won't have to make tough decisions by themselves. Follow-up care is a key part of your treatment and safety. Be sure to make and go to all appointments, and call your doctor if you are having problems. It's also a good idea to know your test results and keep a list of the medicines you take. How can you care for yourself at home? · Discuss your wishes with your loved ones and your doctor. This way, there are no surprises. · Many states have a unique form. Or you might use a universal form that has been approved by many states. This kind of form can sometimes be completed and stored online. Your electronic copy will then be available wherever you have a connection to the Internet.  In most cases, doctors will respect your wishes even if you have a form from a different state. · You don't need a  to do an advance directive. But you may want to get legal advice. · Think about these questions when you prepare an advance directive:  ¨ Who do you want to make decisions about your medical care if you are not able to? Many people choose a family member, close friend, or doctor. ¨ Do you know enough about life support methods that might be used? If not, talk to your doctor so you understand. ¨ What are you most afraid of that might happen? You might be afraid of having pain, losing your independence, or being kept alive by machines. ¨ Where would you prefer to die? Choices include your home, a hospital, or a nursing home. ¨ Would you like to have information about hospice care to support you and your family? ¨ Do you want to donate organs when you die? ¨ Do you want certain Gnosticism practices performed before you die? If so, put your wishes in the advance directive. · Read your advance directive every year, and make changes as needed. When should you call for help? Be sure to contact your doctor if you have any questions. Where can you learn more? Go to http://jeffreySoftSwitching Technologiessam.info/. Enter R264 in the search box to learn more about \"Advance Directives: Care Instructions. \"  Current as of: February 24, 2016  Content Version: 11.1  © 5545-4437 [employer]. Care instructions adapted under license by [employer] (which disclaims liability or warranty for this information). If you have questions about a medical condition or this instruction, always ask your healthcare professional. [place] [address] any warranty or liability for your use of this information. Deciding About Being on a Ventilator When You Have a Terminal Illness  Your Care Instructions  A ventilator is a life-support machine that helps you breathe if you can no longer breathe on your own.  The machine provides oxygen to your lungs through a tube. The tube enters your mouth and goes down your throat to your lungs. Most people on ventilators have to be fed through another tube that goes into the stomach. You may feel that being on a ventilator would prevent a \"natural\" death or would keep you alive longer than necessary. Or you may feel that being on a ventilator would extend your life so you can do certain things, such as saying good-bye to loved ones. The decision about whether to be on a ventilator is a personal one. Be sure to talk it over with your doctor and loved ones. Follow-up care is a key part of your treatment and safety. Be sure to make and go to all appointments, and call your doctor if you are having problems. It's also a good idea to know your test results and keep a list of the medicines you take. Why might you want to be on a ventilator? · You think you may be able to return to your normal activities. · You need help breathing because of a short illness or a problem that is not related to your terminal illness. · You would like more time to say good-bye. · You feel that there are more benefits than risks. Why might you not want to be on a ventilator? · You have other long-term health problems. · You may not be able to return to your normal activities. · You want a calm, peaceful death. You do not want to spend the rest of your life on a ventilator. · You feel that there are more risks than benefits. When should you call for help? Be sure to contact your doctor if:  · You want to learn more about being on a ventilator. · You change your mind about being on a ventilator. Where can you learn more? Go to http://jeffrey-sam.info/. Enter N552 in the search box to learn more about \"Deciding About Being on a Ventilator When You Have a Terminal Illness. \"  Current as of: February 24, 2016  Content Version: 11.1  © 0608-4891 [employer].  Care instructions adapted under license by [facility] (which disclaims liability or warranty for this information). If you have questions about a medical condition or this instruction, always ask your healthcare professional. [address] any warranty or liability for your use of this information.

## 2017-03-21 NOTE — PROGRESS NOTES
Chief Complaint   Patient presents with    Complete Physical         HPI:     Lashawn Rojas is a 46 y.o.  female with history of genital herpes   who presents for complete physical exam. She denies any chest pain, shortness of breath, abdominal pain, headaches or dizziness. She wants to have breast cancer screening gene checked because of family history of breast cancer. Past Medical History:   Diagnosis Date    Genital herpes, unspecified     valtrex at 36wks (she has never had a flare)       History reviewed. No pertinent surgical history. MEDICATION ALLERGIES/INTOLERANCES:   No Known Allergies          CURRENT MEDICATIONS:    Current Outpatient Prescriptions   Medication Sig    PV W-O TAY/FERROUS FUMARATE/FA (M-VIT PO) Take  by mouth daily. Take one po daily    CALCIUM CARBONATE/VITAMIN D3 (CALCIUM + D PO) Take  by mouth. Take 600mg/400 international units  Take   one po daily     DOCOSAHEXANOIC ACID/EPA (FISH OIL PO) Take 1 tablet by mouth daily. 1000 mg     No current facility-administered medications for this visit. Health Maintenance   Topic Date Due    PAP AKA CERVICAL CYTOLOGY  02/18/2018    DTaP/Tdap/Td series (2 - Td) 11/21/2022    INFLUENZA AGE 9 TO ADULT  Completed         FAMILY HISTORY:   Family History   Problem Relation Age of Onset    Cancer Paternal Grandmother      breast cancer and mets    Breast Cancer Paternal Grandmother [de-identified]     age approximate    Hypertension Father     Cancer Sister      cervical cancer screening       SOCIAL HISTORY:   She  reports that she has never smoked.  She has never used smokeless tobacco.  She  reports that she drinks about 0.5 oz of alcohol per week       [address]:  Pap smear: 2014, she will get today  TDAP: per pt in last 10 years (2012)  Flu shot: 2016  Mammogram: 2015, 2016    ROS:   General: negative for - chills, fatigue, fever, weight loss or weight gain, night sweats  HEENT: negative for - no sore throat,vision problems or ear problems, positive nasal congestion  Resp: negative for - cough, shortness of breath or wheezing  CV: negative for - chest pain, palpitations, orthopnea or PND,  GI: negative for - abdominal pain, change in bowel habits, constipation, diarrhea, blood or black tarry stools, or nausea/vomiting  : negative for - dysuria, hematuria, incontinence, pelvic pain or vulvar/vaginal symptoms  Heme: negative for -excessive bleeding or bruising  Endo: negative for - hot flashes, polydipsia/polyuria or hot or cold intolerance  MSK: negative for - joint pain, joint swelling or muscle pain  Neuro: negative for - numbness, tingling, headache or dizziness  Derm: negative for - dry skin, hair changes, rash or skin lesion changes  Psych: negative for - anxiety, depression, irritability or mood swings or insomnia    OBJECTIVE:  PHYSICAL EXAM: Vitals:   Vitals:    03/21/17 0752   BP: 101/68   Pulse: 72   Resp: 16   Temp: 98 °F (36.7 °C)   TempSrc: Oral   SpO2: 100%   Weight: 152 lb 3.2 oz (69 kg)   Height: 5' 6\" (1.676 m)     Generally: Pleasant female in no acute distress    HEENT exam: Head: atraumatic     Eyes: Pupils equally round and reactive to light, Fundoscopic exam is                       normal               Ears: bilaterally: Normal tympanic membrane, no erythema or exudate,                         normal light Reflex    Nares: moist mucosa, no erythema               Mouth: clear, no erythema or exudate     Neck: supple, no lymphadenopathy, negative thyromegaly, negative                                   carotid bruits bilaterally    Cardiac exam: regular, rate, and rhythm. No murmurs, gallops, or rubs. Normal S1 and S2.    Pulmonary exam: Clear to ausculation bilaterally    Abdominal exam: Positive bowel sounds in all four quadrants, soft, nondistended, nontender. No hepatosplenomegaly. Extremities: 2+ dorsalis pedis bilaterally. No pedal edema bilaterally.     Musculoskeletal exam: 5 out of 5 strength in upper and lower extremities bilaterally. Good range of motion in upper and lower extremities. 2 out of 2 reflexes in upper and lower extremities bilaterally. Neurological exam: Cranial nerves II-XII all intact. Normal sensation in upper and lower extremities. Normal gait. Skin: various moles/freckles on both arms and back that look benign. LABS/RADIOLOGICAL TESTS:   Lab Results   Component Value Date/Time    WBC 3.4 06/16/2015 07:35 AM    HGB 14.7 06/16/2015 07:35 AM    HCT 42.7 06/16/2015 07:35 AM    PLATELET 202 72/94/8669 07:35 AM     Lab Results   Component Value Date/Time    Sodium 141 06/16/2015 07:35 AM    Potassium 3.9 06/16/2015 07:35 AM    Chloride 101 06/16/2015 07:35 AM    CO2 23 06/16/2015 07:35 AM    Glucose 81 06/16/2015 07:35 AM    BUN 15 06/16/2015 07:35 AM    Creatinine 0.87 06/16/2015 07:35 AM     Lab Results   Component Value Date/Time    Cholesterol, total 158 06/16/2015 07:35 AM    HDL Cholesterol 65 06/16/2015 07:35 AM    LDL, calculated 82 06/16/2015 07:35 AM    Triglyceride 54 06/16/2015 07:35 AM     No results found for: GPT    Previous labs      ASSESSMENT/PLAN:      ICD-10-CM ICD-9-CM    1. Routine general medical examination at a health care facility Z00.00 V70.0 CBC W/O DIFF      METABOLIC PANEL, COMPREHENSIVE      LIPID PANEL      URINALYSIS W/ RFLX MICROSCOPIC      TSH 3RD GENERATION      CBC W/O DIFF      METABOLIC PANEL, COMPREHENSIVE      LIPID PANEL      URINALYSIS W/ RFLX MICROSCOPIC      TSH 3RD GENERATION   2. Family history of breast cancer Z80.3 V16.3 MISC. LAB TEST. Check BRCA 1 & 2 genes   3. Encounter for vitamin deficiency screening Z13.21 V77.99 VITAMIN D, 25 HYDROXY      VITAMIN D, 25 HYDROXY     4. Patient verbalized understanding and agreement with the plan. 5. Patient was given after visit summary. 6.   Follow-up Disposition:  Return in about 1 year (around 3/21/2018) for CPE.  or sooner if worsening symptoms.         Ashwini Viera MD

## 2017-03-21 NOTE — PROGRESS NOTES
ROOM # 1    Dayton Cha presents today for   Chief Complaint   Patient presents with    Complete Physical       Bonita Goldsmith preferred language for health care discussion is english/other. Is someone accompanying this pt? no    Is the patient using any DME equipment during OV? no    Depression Screening:  PHQ 2 / 9, over the last two weeks 3/21/2017 12/1/2016 10/20/2015 10/8/2014   Little interest or pleasure in doing things Not at all Not at all Not at all Not at all   Feeling down, depressed or hopeless Not at all Not at all Not at all Not at all   Total Score PHQ 2 0 0 0 0       Learning Assessment:  Learning Assessment 10/8/2014   PRIMARY LEARNER Patient   HIGHEST LEVEL OF EDUCATION - PRIMARY LEARNER  4 YEARS OF COLLEGE   BARRIERS PRIMARY LEARNER NONE   CO-LEARNER CAREGIVER No   PRIMARY LANGUAGE ENGLISH   LEARNER PREFERENCE PRIMARY OTHER (COMMENT)   ANSWERED BY self   RELATIONSHIP SELF       Abuse Screening:  Abuse Screening Questionnaire 10/8/2014   Do you ever feel afraid of your partner? N   Are you in a relationship with someone who physically or mentally threatens you? N   Is it safe for you to go home? Y       Fall Risk  No flowsheet data found. Health Maintenance reviewed and discussed per provider. Yes      Advance Directive:  1. Do you have an advance directive in place? Patient Reply: no    2. If not, would you like material regarding how to put one in place? Patient Reply: no    Coordination of Care:  1. Have you been to the ER, urgent care clinic since your last visit? Hospitalized since your last visit? no    2. Have you seen or consulted any other health care providers outside of the [address] [place] [address] since your last visit? Include any pap smears or colon screening.  no

## 2017-03-21 NOTE — TELEPHONE ENCOUNTER
We will not know if her insurance will cover it. The labs are usually sent to the lab the same day. She should probably check with her insurance.

## 2017-03-23 NOTE — TELEPHONE ENCOUNTER
Attempted to contact pt at  number, no answer. Lvm for pt to return call to office at [phone]. Will continue to try to contact pt.

## 2017-03-24 ENCOUNTER — APPOINTMENT (OUTPATIENT)
Dept: PHYSICAL THERAPY | Age: 42
End: 2017-03-24
Payer: OTHER GOVERNMENT

## 2017-03-27 NOTE — TELEPHONE ENCOUNTER
Called pt 2 pt identifiers confirmed informed pt of below she stated understanding and asked about her other results stated understanding will route to Dr Gunjan Phipps and give pt a call back she stated understanding no other questions or concerns noted at this time.     Please advise in regards to other lab results

## 2017-03-28 ENCOUNTER — HOSPITAL ENCOUNTER (OUTPATIENT)
Dept: LAB | Age: 42
Discharge: HOME | End: 2017-03-28
Payer: OTHER GOVERNMENT

## 2017-03-28 DIAGNOSIS — R82.90 ABNORMAL URINE: ICD-10-CM

## 2017-03-28 PROCEDURE — 87086 URINE CULTURE/COLONY COUNT: CPT | Performed by: INTERNAL MEDICINE

## 2017-03-28 NOTE — TELEPHONE ENCOUNTER
2 pt. Identifiers confirmed. Pt. Notified of below. Pt. Verbalized understanding. She is also requesting that the breast cancer gene test ordered [facility] [place] [place] be canceled as she has not gotten a definitive answer for coverage with her insurance. No other questions/concerns at this time.

## 2017-03-28 NOTE — TELEPHONE ENCOUNTER
Spokw with [facility], confirmed pt name and . Advised to please cx test per pt request. Pt labs go to [address] [place] [address], Supervisor on lunch, awaiting final answer as to where the specimen is and wether the test can be cx. Advised [facility] that our phones roll at 6pm, but that they could leave a note in pt's chart in connect care.      Be advised

## 2017-03-28 NOTE — PROGRESS NOTES
Please let pt know that labs were okay except:    1)  Urine showed WBC's and bacteria. Is she having any f/c/ns, dysuria, hematuria, increase urgency/frequency, abd pain, back pain? 2) can she come back to give urint ctx? 3) vitamin D low. Start vitamin D3 2000 international units  Take one po daily OTC.

## 2017-03-28 NOTE — TELEPHONE ENCOUNTER
----- Message from Ivan Bhatt MD sent at 3/27/2017  8:04 PM EDT -----  Please let pt know that labs were okay except:    1)  Urine showed WBC's and bacteria. Is she having any f/c/ns, dysuria, hematuria, increase urgency/frequency, abd pain, back pain? 2) can she come back to give urint ctx? 3) vitamin D low. Start vitamin D3 2000 international units  Take one po daily OTC.

## 2017-03-29 NOTE — TELEPHONE ENCOUNTER
Spoke with [facility] reference lab, confirmed pt name and . Lab states that according to [employer] the test is already in progress, and is unable to be Cx. This is standard procedure for [facility].      Be advised

## 2017-03-30 LAB
BACTERIA SPEC CULT: NORMAL
SERVICE CMNT-IMP: NORMAL

## 2017-03-31 ENCOUNTER — OFFICE VISIT (OUTPATIENT)
Dept: INTERNAL MEDICINE CLINIC | Age: 42
End: 2017-03-31

## 2017-03-31 VITALS
TEMPERATURE: 97.4 F | DIASTOLIC BLOOD PRESSURE: 61 MMHG | HEIGHT: 66 IN | OXYGEN SATURATION: 98 % | RESPIRATION RATE: 16 BRPM | HEART RATE: 74 BPM | SYSTOLIC BLOOD PRESSURE: 102 MMHG | BODY MASS INDEX: 24.27 KG/M2 | WEIGHT: 151 LBS

## 2017-03-31 DIAGNOSIS — H92.02 LEFT EAR PAIN: Primary | ICD-10-CM

## 2017-03-31 NOTE — PROGRESS NOTES
HISTORY OF PRESENT ILLNESS  Lg Carter is a 46 y.o. female. Mass   The history is provided by the patient. This is a new problem. The current episode started 6 to 12 hours ago. The problem occurs constantly. The problem has not changed since onset. Pertinent negatives include no chest pain, no abdominal pain, no headaches and no shortness of breath. Associated symptoms comments: Pain behind the ear, . Nothing aggravates the symptoms. Nothing relieves the symptoms. She has tried nothing for the symptoms. The treatment provided no relief. Review of Systems   Constitutional: Negative for chills, fever and malaise/fatigue. HENT: Negative for congestion, ear discharge, ear pain and sore throat. Eyes: Negative for blurred vision, double vision and photophobia. Respiratory: Negative for cough, sputum production, shortness of breath and wheezing. Cardiovascular: Negative for chest pain and palpitations. Gastrointestinal: Negative for abdominal pain, heartburn, nausea and vomiting. Genitourinary: Negative for dysuria, frequency and urgency. Musculoskeletal: Negative for myalgias. Skin: Negative for itching and rash. Neurological: Negative for dizziness, tingling, sensory change and headaches. Endo/Heme/Allergies: Negative for environmental allergies and polydipsia. Psychiatric/Behavioral: Negative for depression. The patient is not nervous/anxious. Physical Exam   Constitutional: She is oriented to person, place, and time. She appears well-developed. HENT:   Right Ear: Hearing, tympanic membrane, external ear and ear canal normal. No drainage, swelling or tenderness. No mastoid tenderness. Tympanic membrane is not injected and not erythematous. Left Ear: Hearing, tympanic membrane, external ear and ear canal normal. No drainage, swelling or tenderness. No mastoid tenderness. Tympanic membrane is not injected and not erythematous.    Ears:    Mouth/Throat: No oropharyngeal exudate, posterior oropharyngeal edema, posterior oropharyngeal erythema or tonsillar abscesses. Eyes: Pupils are equal, round, and reactive to light. Neck: Neck supple. No thyromegaly present. Cardiovascular: Regular rhythm. Pulmonary/Chest: Breath sounds normal. No respiratory distress. She has no wheezes. Musculoskeletal: Normal range of motion. Lymphadenopathy:     She has no cervical adenopathy. Neurological: She is alert and oriented to person, place, and time. No cranial nerve deficit. Skin: Skin is warm and dry. No erythema. Psychiatric: She has a normal mood and affect. Nursing note and vitals reviewed. ASSESSMENT and PLAN    ICD-10-CM ICD-9-CM    1. Left ear pain H92.02 388.70    patient advised to put heating pad take OTC pain medication as tolerated. Return tomorrow if condition gets worse. Patient verbalized the understanding.

## 2017-03-31 NOTE — PROGRESS NOTES
Pt presented today with lump on left side of head . Patient seen it today and she states it has doubled in size . Has pt had any falls since last visit? no.  Pt preferred language for health care discussion is english. Advanced Directive? no    Is someone accompanying this pt? no    Is the patient using any DME equipment during OV? no      1. Have you been to the ER, urgent care clinic since your last visit? Hospitalized since your last visit? No    2. Have you seen or consulted any other health care providers outside of the [address] [place] [address] since your last visit? Include any pap smears or colon screening. No      Patient  has a reminder for a \"due or due soon\" health maintenance. I have asked that she contact his primary care provider for follow-up on this health maintenance.

## 2017-04-04 LAB
Lab: NORMAL
REFERENCE LAB,REFLB: NORMAL
TEST DESCRIPTION:,ATST: NORMAL

## 2017-04-17 ENCOUNTER — APPOINTMENT (OUTPATIENT)
Dept: PHYSICAL THERAPY | Age: 42
End: 2017-04-17

## 2017-08-14 ENCOUNTER — OFFICE VISIT (OUTPATIENT)
Dept: INTERNAL MEDICINE CLINIC | Age: 42
End: 2017-08-14

## 2017-08-14 VITALS
RESPIRATION RATE: 18 BRPM | SYSTOLIC BLOOD PRESSURE: 112 MMHG | DIASTOLIC BLOOD PRESSURE: 75 MMHG | BODY MASS INDEX: 24.59 KG/M2 | OXYGEN SATURATION: 99 % | WEIGHT: 153 LBS | TEMPERATURE: 98.1 F | HEART RATE: 74 BPM | HEIGHT: 66 IN

## 2017-08-14 DIAGNOSIS — J40 BRONCHITIS: ICD-10-CM

## 2017-08-14 DIAGNOSIS — R05.9 COUGH: Primary | ICD-10-CM

## 2017-08-14 DIAGNOSIS — J06.9 URI, ACUTE: ICD-10-CM

## 2017-08-14 RX ORDER — HYDROCODONE POLISTIREX AND CHLORPHENIRAMINE POLISTIREX 10; 8 MG/5ML; MG/5ML
1 SUSPENSION, EXTENDED RELEASE ORAL
Qty: 118 ML | Refills: 0 | Status: SHIPPED | OUTPATIENT
Start: 2017-08-14 | End: 2018-07-09

## 2017-08-14 RX ORDER — AZITHROMYCIN 250 MG/1
TABLET, FILM COATED ORAL
Qty: 6 TAB | Refills: 0 | Status: SHIPPED | OUTPATIENT
Start: 2017-08-14 | End: 2018-07-09 | Stop reason: ALTCHOICE

## 2017-08-14 NOTE — MR AVS SNAPSHOT
Visit Information Date & Time Provider Department Dept. Phone Encounter #  
 8/14/2017  4:00 PM Phyllis Pérez NP [facility] [phone] [id number] Follow-up Instructions Return if symptoms worsen or fail to improve. Upcoming Health Maintenance Date Due INFLUENZA AGE 9 TO ADULT 8/1/2017 PAP AKA CERVICAL CYTOLOGY 3/21/2020 DTaP/Tdap/Td series (2 - Td) 11/21/2022 Allergies as of 8/14/2017  Review Complete On: 8/14/2017 By: Luis Singh LPN No Known Allergies Current Immunizations  Reviewed on 3/21/2017 Name Date Influenza Vaccine (Quad) PF 10/20/2015 Influenza Vaccine PF 10/8/2014 10:54 AM  
  
 Not reviewed this visit You Were Diagnosed With   
  
 Codes Comments Cough    -  Primary ICD-10-CM: N10 ICD-9-CM: 786.2   
 URI, acute     ICD-10-CM: J06.9 ICD-9-CM: 465.9 Bronchitis     ICD-10-CM: J40 ICD-9-CM: 554 Vitals BP Pulse Temp Resp Height(growth percentile) Weight(growth percentile) 112/75 (BP 1 Location: Right arm, BP Patient Position: Sitting) 74 98.1 °F (36.7 °C) (Oral) 18 5' 6\" (1.676 m) 153 lb (69.4 kg) LMP SpO2 BMI OB Status Smoking Status 08/12/2017 (Exact Date) 99% 24.69 kg/m2 Having regular periods Never Smoker Vitals History BMI and BSA Data Body Mass Index Body Surface Area  
 24.69 kg/m 2 1.8 m 2 Preferred Pharmacy Pharmacy Name Phone [facility], [address] [place] [address] Drive [phone] Your Updated Medication List  
  
   
This list is accurate as of: 8/14/17  4:17 PM.  Always use your most recent med list.  
  
  
  
  
 azithromycin 250 mg tablet Commonly known as:  Loyd Many Take 2 tablets today, then take 1 tablet daily CALCIUM + D PO Take  by mouth. Take 600mg/400 international units  Take  one po daily  
  
 chlorpheniramine-HYDROcodone 10-8 mg/5 mL suspension Commonly known as:  Hilda Buenrostro Take 5 mL by mouth every twelve (12) hours as needed for Cough. Max Daily Amount: 10 mL. FISH OIL PO Take 1 tablet by mouth daily. 1000 mg  
  
 M-VIT PO Take  by mouth daily. Take one po daily Prescriptions Printed Refills  
 chlorpheniramine-HYDROcodone (TUSSIONEX) 10-8 mg/5 mL suspension 0 Sig: Take 5 mL by mouth every twelve (12) hours as needed for Cough. Max Daily Amount: 10 mL. Class: Print Route: Oral  
  
Prescriptions Sent to Pharmacy Refills  
 azithromycin (ZITHROMAX) 250 mg tablet 0 Sig: Take 2 tablets today, then take 1 tablet daily Class: Normal  
 Pharmacy: [facility] [address] Ph #: [phone] Follow-up Instructions Return if symptoms worsen or fail to improve. Patient Instructions Cough: Care Instructions Your Care Instructions A cough is your body's response to something that bothers your throat or airways. Many things can cause a cough. You might cough because of a cold or the flu, bronchitis, or asthma. Smoking, postnasal drip, allergies, and stomach acid that backs up into your throat also can cause coughs. A cough is a symptom, not a disease. Most coughs stop when the cause, such as a cold, goes away. You can take a few steps at home to cough less and feel better. Follow-up care is a key part of your treatment and safety. Be sure to make and go to all appointments, and call your doctor if you are having problems. It's also a good idea to know your test results and keep a list of the medicines you take. How can you care for yourself at home? · Drink lots of water and other fluids. This helps thin the mucus and soothes a dry or sore throat. Honey or lemon juice in hot water or tea may ease a dry cough. · Take cough medicine as directed by your doctor. · Prop up your head on pillows to help you breathe and ease a dry cough. · Try cough drops to soothe a dry or sore throat. Cough drops don't stop a cough. Medicine-flavored cough drops are no better than candy-flavored drops or hard candy. · Do not smoke. Avoid secondhand smoke. If you need help quitting, talk to your doctor about stop-smoking programs and medicines. These can increase your chances of quitting for good. When should you call for help? Call 911 anytime you think you may need emergency care. For example, call if: 
· You have severe trouble breathing. Call your doctor now or seek immediate medical care if: 
· You cough up blood. · You have new or worse trouble breathing. · You have a new or higher fever. · You have a new rash. Watch closely for changes in your health, and be sure to contact your doctor if: 
· You cough more deeply or more often, especially if you notice more mucus or a change in the color of your mucus. · You have new symptoms, such as a sore throat, an earache, or sinus pain. · You do not get better as expected. Where can you learn more? Go to http://jeffreyTaste Kitchensam.info/. Enter D279 in the search box to learn more about \"Cough: Care Instructions. \" Current as of: March 25, 2017 Content Version: 11.3 © 5669-2288 [employer]. Care instructions adapted under license by [employer] (which disclaims liability or warranty for this information). If you have questions about a medical condition or this instruction, always ask your healthcare professional. [place] [address] any warranty or liability for your use of this information. Bronchitis: Care Instructions Your Care Instructions Bronchitis is inflammation of the bronchial tubes, which carry air to the lungs. The tubes swell and produce mucus, or phlegm. The mucus and inflamed bronchial tubes make you cough. You may have trouble breathing.  
Most cases of bronchitis are caused by viruses like those that cause colds. Antibiotics usually do not help and they may be harmful. Bronchitis usually develops rapidly and lasts about 2 to 3 weeks in otherwise healthy people. Follow-up care is a key part of your treatment and safety. Be sure to make and go to all appointments, and call your doctor if you are having problems. It's also a good idea to know your test results and keep a list of the medicines you take. How can you care for yourself at home? · Take all medicines exactly as prescribed. Call your doctor if you think you are having a problem with your medicine. · Get some extra rest. 
· Take an over-the-counter pain medicine, such as acetaminophen (Tylenol), ibuprofen (Advil, Motrin), or naproxen (Aleve) to reduce fever and relieve body aches. Read and follow all instructions on the label. · Do not take two or more pain medicines at the same time unless the doctor told you to. Many pain medicines have acetaminophen, which is Tylenol. Too much acetaminophen (Tylenol) can be harmful. · Take an over-the-counter cough medicine that contains dextromethorphan to help quiet a dry, hacking cough so that you can sleep. Avoid cough medicines that have more than one active ingredient. Read and follow all instructions on the label. · Breathe moist air from a humidifier, hot shower, or sink filled with hot water. The heat and moisture will thin mucus so you can cough it out. · Do not smoke. Smoking can make bronchitis worse. If you need help quitting, talk to your doctor about stop-smoking programs and medicines. These can increase your chances of quitting for good. When should you call for help? Call 911 anytime you think you may need emergency care. For example, call if: 
· You have severe trouble breathing. Call your doctor now or seek immediate medical care if: 
· You have new or worse trouble breathing. · You cough up dark brown or bloody mucus (sputum). · You have a new or higher fever. · You have a new rash. Watch closely for changes in your health, and be sure to contact your doctor if: 
· You cough more deeply or more often, especially if you notice more mucus or a change in the color of your mucus. · You are not getting better as expected. Where can you learn more? Go to http://jeffrey-sam.info/. Enter H333 in the search box to learn more about \"Bronchitis: Care Instructions. \" Current as of: March 25, 2017 Content Version: 11.3 © 3743-1114 [employer]. Care instructions adapted under license by [employer] (which disclaims liability or warranty for this information). If you have questions about a medical condition or this instruction, always ask your healthcare professional. [address] any warranty or liability for your use of this information. Introducing [facility]! [employer] introduces waygum patient portal. Now you can access parts of your medical record, email your doctor's office, and request medication refills online. 1. In your internet browser, go to [email or web address] 2. Click on the First Time User? Click Here link in the Sign In box. You will see the New Member Sign Up page. 3. Enter your waygum Access Code exactly as it appears below. You will not need to use this code after youve completed the sign-up process. If you do not sign up before the expiration date, you must request a new code. · waygum Access Code: [email or web address] Expires: 11/12/2017  4:17 PM 
 
4. Enter the last four digits of your Social Security Number (xxxx) and Date of Birth (mm/dd/yyyy) as indicated and click Submit. You will be taken to the next sign-up page. 5. Create a waygum ID. This will be your waygum login ID and cannot be changed, so think of one that is secure and easy to remember. 6. Create a [employer] password. You can change your password at any time. 7. Enter your Password Reset Question and Answer. This can be used at a later time if you forget your password. 8. Enter your e-mail address. You will receive e-mail notification when new information is available in [address] 9. Click Sign Up. You can now view and download portions of your medical record. 10. Click the Download Summary menu link to download a portable copy of your medical information. If you have questions, please visit the Frequently Asked Questions section of the [employer] website. Remember, Simply Zesty is NOT to be used for urgent needs. For medical emergencies, dial 911. Now available from your iPhone and Android! Please provide this summary of care documentation to your next provider. Your primary care clinician is listed as Meghna Banks. If you have any questions after today's visit, please call [phone].

## 2017-08-14 NOTE — PATIENT INSTRUCTIONS
Cough: Care Instructions  Your Care Instructions  A cough is your body's response to something that bothers your throat or airways. Many things can cause a cough. You might cough because of a cold or the flu, bronchitis, or asthma. Smoking, postnasal drip, allergies, and stomach acid that backs up into your throat also can cause coughs. A cough is a symptom, not a disease. Most coughs stop when the cause, such as a cold, goes away. You can take a few steps at home to cough less and feel better. Follow-up care is a key part of your treatment and safety. Be sure to make and go to all appointments, and call your doctor if you are having problems. It's also a good idea to know your test results and keep a list of the medicines you take. How can you care for yourself at home? · Drink lots of water and other fluids. This helps thin the mucus and soothes a dry or sore throat. Honey or lemon juice in hot water or tea may ease a dry cough. · Take cough medicine as directed by your doctor. · Prop up your head on pillows to help you breathe and ease a dry cough. · Try cough drops to soothe a dry or sore throat. Cough drops don't stop a cough. Medicine-flavored cough drops are no better than candy-flavored drops or hard candy. · Do not smoke. Avoid secondhand smoke. If you need help quitting, talk to your doctor about stop-smoking programs and medicines. These can increase your chances of quitting for good. When should you call for help? Call 911 anytime you think you may need emergency care. For example, call if:  · You have severe trouble breathing. Call your doctor now or seek immediate medical care if:  · You cough up blood. · You have new or worse trouble breathing. · You have a new or higher fever. · You have a new rash.   Watch closely for changes in your health, and be sure to contact your doctor if:  · You cough more deeply or more often, especially if you notice more mucus or a change in the color of your mucus. · You have new symptoms, such as a sore throat, an earache, or sinus pain. · You do not get better as expected. Where can you learn more? Go to http://jeffrey-sam.info/. Enter D279 in the search box to learn more about \"Cough: Care Instructions. \"  Current as of: March 25, 2017  Content Version: 11.3  © 2006-2017 [employer]. Care instructions adapted under license by [employer] (which disclaims liability or warranty for this information). If you have questions about a medical condition or this instruction, always ask your healthcare professional. [place] [address] any warranty or liability for your use of this information. Bronchitis: Care Instructions  Your Care Instructions    Bronchitis is inflammation of the bronchial tubes, which carry air to the lungs. The tubes swell and produce mucus, or phlegm. The mucus and inflamed bronchial tubes make you cough. You may have trouble breathing. Most cases of bronchitis are caused by viruses like those that cause colds. Antibiotics usually do not help and they may be harmful. Bronchitis usually develops rapidly and lasts about 2 to 3 weeks in otherwise healthy people. Follow-up care is a key part of your treatment and safety. Be sure to make and go to all appointments, and call your doctor if you are having problems. It's also a good idea to know your test results and keep a list of the medicines you take. How can you care for yourself at home? · Take all medicines exactly as prescribed. Call your doctor if you think you are having a problem with your medicine. · Get some extra rest.  · Take an over-the-counter pain medicine, such as acetaminophen (Tylenol), ibuprofen (Advil, Motrin), or naproxen (Aleve) to reduce fever and relieve body aches. Read and follow all instructions on the label. · Do not take two or more pain medicines at the same time unless the doctor told you to.  Many pain medicines have acetaminophen, which is Tylenol. Too much acetaminophen (Tylenol) can be harmful. · Take an over-the-counter cough medicine that contains dextromethorphan to help quiet a dry, hacking cough so that you can sleep. Avoid cough medicines that have more than one active ingredient. Read and follow all instructions on the label. · Breathe moist air from a humidifier, hot shower, or sink filled with hot water. The heat and moisture will thin mucus so you can cough it out. · Do not smoke. Smoking can make bronchitis worse. If you need help quitting, talk to your doctor about stop-smoking programs and medicines. These can increase your chances of quitting for good. When should you call for help? Call 911 anytime you think you may need emergency care. For example, call if:  · You have severe trouble breathing. Call your doctor now or seek immediate medical care if:  · You have new or worse trouble breathing. · You cough up dark brown or bloody mucus (sputum). · You have a new or higher fever. · You have a new rash. Watch closely for changes in your health, and be sure to contact your doctor if:  · You cough more deeply or more often, especially if you notice more mucus or a change in the color of your mucus. · You are not getting better as expected. Where can you learn more? Go to http://jeffrey-sam.info/. Enter H333 in the search box to learn more about \"Bronchitis: Care Instructions. \"  Current as of: March 25, 2017  Content Version: 11.3  © 4590-4455 [employer]. Care instructions adapted under license by [employer] (which disclaims liability or warranty for this information). If you have questions about a medical condition or this instruction, always ask your healthcare professional. [address] any warranty or liability for your use of this information.

## 2017-08-14 NOTE — PROGRESS NOTES
ROOM # 8    Torsten Motta presents today for   Chief Complaint   Patient presents with    Cough     pt states she been having a cough for the past 2 weeks, pt states cough is now making her chest hurt along with sweating        Bonita Mihaelaherminia preferred language for health care discussion is english/other. Is someone accompanying this pt? 2 kids    Is the patient using any DME equipment during OV? no    Depression Screening:  PHQ over the last two weeks 8/14/2017 3/21/2017 12/1/2016 10/20/2015 10/8/2014   Little interest or pleasure in doing things Not at all Not at all Not at all Not at all Not at all   Feeling down, depressed or hopeless Not at all Not at all Not at all Not at all Not at all   Total Score PHQ 2 0 0 0 0 0       Learning Assessment:  Learning Assessment 10/8/2014   PRIMARY LEARNER Patient   HIGHEST LEVEL OF EDUCATION - PRIMARY LEARNER  4 YEARS OF COLLEGE   BARRIERS PRIMARY LEARNER NONE   CO-LEARNER CAREGIVER No   PRIMARY LANGUAGE ENGLISH   LEARNER PREFERENCE PRIMARY OTHER (COMMENT)   ANSWERED BY self   RELATIONSHIP SELF       Abuse Screening:  Abuse Screening Questionnaire 10/8/2014   Do you ever feel afraid of your partner? N   Are you in a relationship with someone who physically or mentally threatens you? N   Is it safe for you to go home? Y       Fall Risk  No flowsheet data found. Health Maintenance reviewed and discussed per provider. Yes    Torsten Motta is due for influenza  Please order/place referral if appropriate. Advance Directive:  1. Do you have an advance directive in place? Patient Reply: no    2. If not, would you like material regarding how to put one in place? Patient Reply: no    Coordination of Care:  1. Have you been to the ER, urgent care clinic since your last visit? Hospitalized since your last visit? no    2. Have you seen or consulted any other health care providers outside of the [address] [place] [address] since your last visit?  Include any pap smears or colon screening.  no

## 2017-08-15 NOTE — PROGRESS NOTES
HISTORY OF PRESENT ILLNESS  Mita Cheng is a 46 y.o. female. Patient presents with productive cough of greenish phlegm, sore throat, nasal congestion,body aches with intermittent fever,chills x 2 weeks. Patient states she has been using OTC Mucinex with minimal relief,states cough is worse at night. Patient denies any fever,chills today. Patient denies any NVD,SOb,CP,dizziness,drooling. HPI    Review of Systems   Constitutional: Negative. HENT: Positive for congestion, ear pain and sore throat. Respiratory: Positive for cough and sputum production. Gastrointestinal: Negative. Genitourinary: Negative. Skin: Negative. Psychiatric/Behavioral: Negative. Physical Exam   Constitutional: She is oriented to person, place, and time. She appears well-developed and well-nourished. LMP 08/12/2017 (Exact Date)     HENT:   Head: Normocephalic and atraumatic. Eyes: Conjunctivae and EOM are normal. Pupils are equal, round, and reactive to light. Neck: Normal range of motion. Cardiovascular: Normal rate. Pulmonary/Chest: Effort normal and breath sounds normal.   Abdominal: Soft. Bowel sounds are normal.   Musculoskeletal: Normal range of motion. Neurological: She is alert and oriented to person, place, and time. GCS eye subscore is 4. GCS verbal subscore is 5. GCS motor subscore is 6. Skin: Skin is warm and dry. Psychiatric: She has a normal mood and affect. Her speech is normal and behavior is normal. Judgment and thought content normal. Cognition and memory are normal.   Vitals reviewed. ASSESSMENT and PLAN    ICD-10-CM ICD-9-CM    1. ERRONEOUS ENCOUNTER--DISREGARD  35331      Encounter Diagnoses   Name Primary?  ERRONEOUS ENCOUNTER--DISREGARD Yes     No orders of the defined types were placed in this encounter. No orders of the defined types were placed in this encounter. No orders of the defined types were placed in this encounter.     Diagnoses and all orders for this visit: 1. ERRONEOUS ENCOUNTER--DISREGARD      Follow-up Disposition:  Return if symptoms worsen or fail to improve.   current treatment plan is effective, no change in therapy

## 2017-08-15 NOTE — PROGRESS NOTES
Patient presents with productive cough of greenish phlegm, sore throat, nasal congestion,body aches with intermittent fever,chills x 2 weeks. Patient states she has been using OTC Mucinex with minimal relief,states cough is worse at night. Patient denies any fever,chills today. Patient denies any NVD,SOb,CP,dizziness,drooling.

## 2017-08-15 NOTE — PROGRESS NOTES
HISTORY OF PRESENT ILLNESS  Tatiana Padron is a 46 y.o. female. Patient presents with productive cough of greenish phlegm, sore throat, nasal congestion,body aches with intermittent fever,chills x 2 weeks. Patient states she has been using OTC Mucinex with minimal relief,states cough is worse at night. Patient denies any fever,chills today. Patient denies any NVD,SOb,CP,dizziness,drooling. Cough   The history is provided by the patient. This is a new problem. The current episode started more than 1 week ago. The problem occurs constantly. The problem has been gradually worsening. Pertinent negatives include no chest pain, no abdominal pain, no headaches and no shortness of breath. The symptoms are aggravated by coughing. Treatments tried: Mucinex OTC. The treatment provided mild relief. Review of Systems   Constitutional: Negative. HENT: Positive for congestion, ear pain and sore throat. Respiratory: Positive for cough and sputum production. Negative for shortness of breath. Cardiovascular: Negative for chest pain. Gastrointestinal: Negative. Negative for abdominal pain. Genitourinary: Negative. Musculoskeletal: Negative. Neurological: Negative. Negative for headaches. Psychiatric/Behavioral: Negative. Physical Exam   Constitutional: She is oriented to person, place, and time. She appears well-developed and well-nourished. /75 (BP 1 Location: Right arm, BP Patient Position: Sitting)  Pulse 74  Temp 98.1 °F (36.7 °C) (Oral)   Resp 18  Ht 5' 6\" (1.676 m)  Wt 153 lb (69.4 kg)  LMP 08/12/2017 (Exact Date)  SpO2 99%  BMI 24.69 kg/m2     HENT:   Head: Normocephalic and atraumatic. Eyes: Conjunctivae and EOM are normal. Pupils are equal, round, and reactive to light. Neck: Normal range of motion. Cardiovascular: Normal rate. Pulmonary/Chest: Effort normal and breath sounds normal.   Abdominal: Soft.  Bowel sounds are normal.   Musculoskeletal: Normal range of motion. Neurological: She is alert and oriented to person, place, and time. GCS eye subscore is 4. GCS verbal subscore is 5. GCS motor subscore is 6. Skin: Skin is warm and dry. Psychiatric: She has a normal mood and affect. Her speech is normal and behavior is normal. Judgment and thought content normal. Cognition and memory are normal.   Vitals reviewed. ASSESSMENT and PLAN    ICD-10-CM ICD-9-CM    1. Cough R05 786.2 chlorpheniramine-HYDROcodone (TUSSIONEX) 10-8 mg/5 mL suspension      azithromycin (ZITHROMAX) 250 mg tablet   2. URI, acute J06.9 465.9 chlorpheniramine-HYDROcodone (TUSSIONEX) 10-8 mg/5 mL suspension      azithromycin (ZITHROMAX) 250 mg tablet   3. Bronchitis J40 490 chlorpheniramine-HYDROcodone (TUSSIONEX) 10-8 mg/5 mL suspension      azithromycin (ZITHROMAX) 250 mg tablet     Encounter Diagnoses   Name Primary?  Cough Yes    URI, acute     Bronchitis      Orders Placed This Encounter    chlorpheniramine-HYDROcodone (TUSSIONEX) 10-8 mg/5 mL suspension    azithromycin (ZITHROMAX) 250 mg tablet     Orders Placed This Encounter    chlorpheniramine-HYDROcodone (TUSSIONEX) 10-8 mg/5 mL suspension     Sig: Take 5 mL by mouth every twelve (12) hours as needed for Cough. Max Daily Amount: 10 mL. Dispense:  118 mL     Refill:  0    azithromycin (ZITHROMAX) 250 mg tablet     Sig: Take 2 tablets today, then take 1 tablet daily     Dispense:  6 Tab     Refill:  0     Orders Placed This Encounter    chlorpheniramine-HYDROcodone (TUSSIONEX) 10-8 mg/5 mL suspension    azithromycin (ZITHROMAX) 250 mg tablet     Diagnoses and all orders for this visit:    1. Cough  -     chlorpheniramine-HYDROcodone (TUSSIONEX) 10-8 mg/5 mL suspension; Take 5 mL by mouth every twelve (12) hours as needed for Cough. Max Daily Amount: 10 mL.  -     azithromycin (ZITHROMAX) 250 mg tablet;  Take 2 tablets today, then take 1 tablet daily    2. URI, acute  -     chlorpheniramine-HYDROcodone (TUSSIONEX) 10-8 mg/5 mL suspension; Take 5 mL by mouth every twelve (12) hours as needed for Cough. Max Daily Amount: 10 mL.  -     azithromycin (ZITHROMAX) 250 mg tablet; Take 2 tablets today, then take 1 tablet daily    3. Bronchitis  -     chlorpheniramine-HYDROcodone (TUSSIONEX) 10-8 mg/5 mL suspension; Take 5 mL by mouth every twelve (12) hours as needed for Cough. Max Daily Amount: 10 mL.  -     azithromycin (ZITHROMAX) 250 mg tablet; Take 2 tablets today, then take 1 tablet daily      Follow-up Disposition:  Return if symptoms worsen or fail to improve.   current treatment plan is effective, no change in therapy

## 2017-12-13 NOTE — PROGRESS NOTES
PT DAILY TREATMENT NOTE     Patient Name: Ann Lyle  Date:3/6/2017  : 1975  [x]  Patient  Verified  Payor: [facility] [place] / Plan: [employer] [place] [facility] [address] AND DEPENDENTS / Product Type: [place] [employer] /    In time:9:30  Out time:10:32  Total Treatment Time (min): 62  Total Timed Codes (min): 62  1:1 Treatment Time (min): 62   Visit #: 3 of     Treatment Area: Acute back pain [M54.9]    SUBJECTIVE  Pain Level (0-10 scale): 0 to 0.5/10  Any medication changes, allergies to medications, adverse drug reactions, diagnosis change, or new procedure performed?: [x] No    [] Yes (see summary sheet for update)  Subjective functional status/changes:   [] No changes reported  Pt notes limitation with going on point (up in full PF) during barre classes when using hte L foot only. NOtes (L) heel pain when in the car driving. Unable to reliably reproduce with sitting, standing, etc.     OBJECTIVE      47 min Therapeutic Exercise:  [x] See flow sheet : initiate pallof press in SLS 5x each leg, each direction. Rationale: increase ROM, increase strength, improve balance and increase proprioception to improve the patients ability to improve return to running     15 min Manual Therapy:  Pelvis neutral. No TTP or referred pain from the (L) SIJ, glute, HS or the gastroc/soleus into the (L) Heel. DTM to the (L) G/S complex in prone. TCJ mobilziations and grade V manip. Calcaneal mobs to improve inversion/eversion. DTM to L Calcaneus. Deferred taping to the foot due to hot yoga    Rationale: decrease pain, increase ROM, increase tissue extensibility and decrease trigger points to improve gait, jumping, barre, comfort with walking           x min Patient Education: [x] Review HEP    [] Progressed/Changed HEP based on:   [] positioning   [] body mechanics   [] transfers   [] heat/ice application        Other Objective/Functional Measures:   Initiate pallof press in SLS for foot, core and hip stability.  Performed 5x on each foot, with resistance coming from L and then R  Pt demo's weakness in the R glute med and difficulty with Pallof press SLS on the R with resistance from the (L). Notes difficult and demo's instability with L SLS. SLR (L) (-), Slump (-)  Increased L heel pain with short sitting on edge of table. Pain Level (0-10 scale) post treatment: 0    ASSESSMENT/Changes in Function: Pt has no apparent discongenic or nerve tension component for her heel pain. Does not appear to be referred pain from any muscular structure up the chain. Pt has improved glute med stability on the (L) but con't with weakness and hip drop on the R SLS. Pt to be seen for custom orthotics fitting on Monday. Patient will continue to benefit from skilled PT services to modify and progress therapeutic interventions, address functional mobility deficits, address ROM deficits, address strength deficits, analyze and address soft tissue restrictions, analyze and cue movement patterns, analyze and modify body mechanics/ergonomics, assess and modify postural abnormalities and instruct in home and community integration to attain remaining goals. []  See Plan of Care  []  See progress note/recertification  []  See Discharge Summary         Progress towards goals / Updated goals:  1. Patient will be (I) with progression of HEP in preparation for D/C. -Goal progressing; patient reporting compliance with HAL and SL bridges (2/27/17)  2. Patient will demo 4+/5 hip ABD strength without substitutions to improve stability for running. -Quick onset of fatigue with clam II (2/27/17); hip drop with R SLS pallof press (3/6/17)  3. Patient will report 80% improvement in symptoms indicating significant inc in QOL.  Progressing with improvements in SIJ, LBP and hips noted but chief c/o (L) heel and PF pain limiting return to running (3/6/17)  PLAN  [x]  Upgrade activities as tolerated     []  Continue plan of care  []  Update interventions per flow sheet []  Discharge due to:_  []  Other:_      Buckley Habermann, PT 3/6/2017  7:36 AM Eyes with no visual disturbances.  Ears clean and dry and no hearing difficulties. Nose with pink mucosa and no drainage.  Mouth mucous membranes moist and pink.  No tenderness or swelling to throat or neck.

## 2017-12-15 ENCOUNTER — HOSPITAL ENCOUNTER (OUTPATIENT)
Dept: PHYSICAL THERAPY | Age: 42
Discharge: HOME | End: 2017-12-15
Payer: OTHER GOVERNMENT

## 2017-12-15 PROCEDURE — 97162 PT EVAL MOD COMPLEX 30 MIN: CPT

## 2017-12-15 PROCEDURE — 97140 MANUAL THERAPY 1/> REGIONS: CPT

## 2017-12-15 PROCEDURE — 97014 ELECTRIC STIMULATION THERAPY: CPT

## 2017-12-15 NOTE — PROGRESS NOTES
[facility] PHYSICAL THERAPY AT [address]  Phone: [phone] Fax: [phone] / [address] [place] [address] PHYSICAL THERAPY SERVICES  Patient Name: Kwesi Busby : 1975   Medical   Diagnosis: Acute back pain [M54.9] Treatment Diagnosis: CS, LS and L LE myofascial pain    Onset Date: 2017     Referral Source: Juli Cordoba MD Start of [facility]): 12/15/2017   Prior Hospitalization: See medical history Provider #: [id number]   Prior Level of Function: Functional I    Comorbidities: Arthritis    Medications: Verified on Patient Summary List   The Plan of Care and following information is based on the information from the initial evaluation.   ========================================================================  Assessment / key information:  Pt is a 38y.o. year old female who presents with LS and CS pain and tightness with L calf pain starting preliminarily multiple years ago but with exacerbated in Mid Nov by long car ride causing 3 day severe flareup. Patient was treated previously with PT for same condition with good outcomes . Recent treatment include DTM for B piriformis, hot yoga 1x per week and OTC ibuprofen (and one treatment of IMT/ dry needling outside PT). Current deficits include: increased pain to 8/10 at worst, decreased AROM CS 50% throughout, LSAROM WNL but with decreased mechanics for LS, tightness of CS and LS paraspinals, scalenes, SCM, QL and B gluteals/ piriformis. Functional deficits include: sleep disturbance, am stiffness , fear of re injury, lifting kids, bending over to bath children, work duties: prolonged sitting. Home exercise program initiated on initial evaluation to address these deficits. Pt would benefit from PT to address these deficits for increased functional mobility and QOL.  IF AGREEABLE TO TREAT CS AS WELL PLEASE SIGN BELOW ========================================================================  Eval Complexity: History: MEDIUM  Complexity : 1-2 comorbidities / personal factors will impact the outcome/ POC Exam:HIGH Complexity : 4+ Standardized tests and measures addressing body structure, function, activity limitation and / or participation in recreation  Presentation: MEDIUM Complexity : Evolving with changing characteristics  Clinical Decision Making:MEDIUM Complexity : FOTO score of 26-74Overall Complexity:MEDIUM  Problem List: pain affecting function, decrease ROM, decrease ADL/ functional abilitiies, decrease activity tolerance, decrease flexibility/ joint mobility and other FOTO 70/100   Treatment Plan may include any combination of the following: Therapeutic exercise, Therapeutic activities, Neuromuscular re-education, Physical agent/modality, Gait/balance training, Manual therapy, Aquatic therapy, Patient education, Self Care training, Functional mobility training, Home safety training, Stair training and Other: IMT/ DRY NEEDLING   Patient / Family readiness to learn indicated by: asking questions, trying to perform skills and interest  Persons(s) to be included in education: patient (P)  Barriers to Learning/Limitations: None  Measures taken:    Patient Goal (s): \"No pain \"   Patient self reported health status: excellent  Rehabilitation Potential: good   Short Term Goals: To be accomplished in  1  weeks:  1. Pt will be independent and compliant with HEP   Long Term Goals: To be accomplished in  8-12  treatments:  1. Patient will increase FOTO score to 81/100 for indications of increased functional mobility. 2.  Patient will demo full CS rotation for ease and safety with driving   3. Patient will report pain 5/10 at worst for improved QOL   4.  Patient will demo LS flexion with no LS extension moment for improved mechanics with bending to bath children   Frequency / Duration:   Patient to be seen  1-2  times per week for 8-12  treatments:  Patient / Caregiver education and instruction: self care, activity modification and exercises  G-Codes (GP): SAYRA  Therapist Signature: Nestor Rush PT Date: 86/30/6983   Certification Period: NA Time: 1040am   ========================================================================  I certify that the above Physical Therapy Services are being furnished while the patient is under my care. I agree with the treatment plan and certify that this therapy is necessary. Physician Signature:        Date:       Time:   Please sign and return to [facility] or you may fax the signed copy to [phone]. Thank you.

## 2017-12-15 NOTE — PROGRESS NOTES
PT LUMBAR EVAL AND TREATMENT     Patient Name: Adryan Gonzalez  Date:12/15/2017  : 1975  [x]  Patient  Verified  Payor:  / Plan: [email or web address] [address] AND DEPENDENTS / Product Type:  /    In time:935  Out time:1025  Total Treatment Time (min): 50  Visit #: 1 of 8-12    Treatment Area: Acute back pain [M54.9]    SUBJECTIVE  Pain Level (0-10 scale): (C): 1 (B): 0  (W):  8  Any medication changes, allergies to medications, diagnosis change, or new procedure performed: see summary sheet for update  Subjective functional status/changes  CHIEF COMPLAINT: Patient presents with LS and CS pain and tightness with L calf pain starting preliminarily multiple years ago but with exacerbated in Mid Nov by long car ride causing 3 day severe flareup. Patient was treated previously with PT for same condition with good outcomes .   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, work duties: prolonged sitting   Mechanism of injury:   Symptoms:  Aggravated by: see functional limitations   Eased by: DTM and IMT     Past History/Treatments:  PT   Diagnostic Tests: NA    OBJECTIVE  Posture:  Lateral Shift: Negative   Decreased lordosis in sitting     Gait:  WNL     Active Movements: co tightness   ROM % AROM Comments:pain, area   Forward flexion 40-60 WNL  throuhgout Poor mechanics, increased LS extension    Extension 20-30     SB right 20-30     SB left 20-30     Rotation right 5-10     Rotation left 5-10       CS AROM : decreased 50% throughout    On and off headaches     Dural Mobility:  Seated slump test negative     Palpation CS paraspinal     Strength  Hip : WNL   Core: good  Special Tests    Sacroilliac:  Negative          Hip: Lee Ann Test WNL     Piriformis: Mild tightness           Deficits: ELIZABETH moderate tightness B     Hamstrings 90/90: WNL     Gastrocsoleus WNL     Other tests/comments:       Patient Education/ Therapeutic Exercise : [x] Discussed POT including PT expectation, established HEP with pictures and instruction  (minutes) : NT     Manual 15 min: CS mobs for extension and rotation, STM / DTM for CS paraspinals , scalenes    Modality (rationale): decrease pain and tightness   [x]  E-Stim: type PM B CS with MHP 10 min     Pain Level (0-10 scale) post treatment: 0    ASSESSMENT  [x]  See Plan of Care    PLAN  [x]  Upgrade activities as tolerated      [x] Other:_  POC 2-3 x 8-12  Carmen Watts, PT 12/15/2017      Justification for Eval Code Complexity:  Patient History : previous PT for same condition   Examination see exam   Clinical Presentation: evolving sec to persistent myofascial tightness despite active lifestyle   Clinical Decision Making : FOTO : 70 /100

## 2017-12-22 ENCOUNTER — HOSPITAL ENCOUNTER (OUTPATIENT)
Dept: PHYSICAL THERAPY | Age: 42
Discharge: HOME | End: 2017-12-22
Payer: OTHER GOVERNMENT

## 2017-12-22 PROCEDURE — 97140 MANUAL THERAPY 1/> REGIONS: CPT

## 2017-12-22 PROCEDURE — 97014 ELECTRIC STIMULATION THERAPY: CPT

## 2017-12-22 PROCEDURE — 97110 THERAPEUTIC EXERCISES: CPT

## 2017-12-22 NOTE — PROGRESS NOTES
PT DAILY TREATMENT NOTE 8-    Patient Name: Leona Hoskins  Date:2017  : 1975  [x]  Patient  Verified  Payor:  / Plan: [employer] [facility] [address] AND DEPENDENTS / Product Type:  /    In time:756  Out time:900  Total Treatment Time (min): 59  Visit #: 2 of -12    Treatment Area: Acute back pain [M54.9]    SUBJECTIVE  Pain Level (0-10 scale): 0  Any medication changes, allergies to medications, adverse drug reactions, diagnosis change, or new procedure performed?: [x] No    [] Yes (see summary sheet for update)  Subjective functional status/changes:   [] No changes reported  \"Its just really tight. \"    OBJECTIVE  Modality rationale: decrease pain and increase tissue extensibility to improve the patients ability to progress to increase function    Min Type Additional Details   10 [x] Estim: []Att   []Unatt        []TENS instruct                  []IFC  [x]Premod   []NMES                     []Other:  []w/US   []w/ice   [x]w/heat  Position: semi reclined  Location: B LS and L glut max     []  Traction: [] Cervical       []Lumbar                       [] Prone          []Supine                       []Intermittent   []Continuous Lbs:  [] before manual  [] after manual    []  Ultrasound: []Continuous   [] Pulsed                           []1MHz   []3MHz Location:  W/cm2:    []  Iontophoresis with dexamethasone         Location: [] Take home patch   [] In clinic    []  Ice     []  heat  []  Ice massage Position:  Location:    []  Vasopneumatic Device Pressure:       [] lo [] med [] hi   Temperature: [] lo [] med [] hi   [x] Skin assessment post-treatment:  [x]intact []redness- no adverse reaction       []redness  adverse reaction:       29 min Therapeutic Exercise:  [x] See flow sheet :Initiated ther ex per flow sheet    Rationale: increase ROM, increase strength and improve flexibility  to improve the patients ability to decrease pain with activity and improve QOL      25 min Manual Therapy:  SI check - R ant rotation - corrected with MET and shot gun Technique:      [x] S/DTM []IASTM [x]PROM [x] Passive Stretching   [x]manual TPR  [x] TDN (see objective; actual needle insertion time not billed)  []Jt manipulation:Gr I [] II []  III [] IV[] V[]  Treatment/Area:  See below    Rationale:      decrease pain, increase ROM, increase tissue extensibility and decrease trigger points to improve patient's ability to increase activity tolerance with ADLs and taking care of children     Dry Needling Procedure Note    Dry Needle Session Number:  1    Procedure: An intramuscular manual therapy (dry needling) and a neuro-muscular re-education treatment was done to deactivate myofascial trigger points, with a 15/30 gauge solid filament needle, under aseptic technique. Indication(s): [x] Muscle spasms [x] Myalgia/Myositis  [] Muscle cramps      [] Muscle imbalances [] TMD (TMJ) [] Myofascial pain & dysfunction     [] Other: __    Chart reviewed for the following:  IGenny, PT, have reviewed the medical history, summary list and precautions/contraindications for [employer].      TIME OUT performed immediately prior to start of procedure:  820am (enter time the timeout was completed)  Genny GUNTER, PT, have performed the following reviews on Bonita Goldsmith prior to the start of the session:      [x] Patient was identified by name and date of birth    [x] Agreement on all muscles being treated was verified   [x] Purpose of dry needling, side effects, possible complications, risks and benefits were explained to the patient   [x] Procedure site(s) verified  [x] Patient was positioned for comfort and draped for privacy  [x] Informed Consent was signed (initial visit) and verified verbally (subsequent visits)  [x] Patient was instructed on the need to report the use of blood thinners and/or immunosuppressant medications  [x] How to respond to possible adverse effects of treatment  [x] Self treatment of post needling soreness: ice, heat (moist heat, heat wraps) and stretching  [x] Opportunity was given to ask any questions, all questions were answered            Treatment:  The following muscles were treated today:    Right: LS and UT   Left: LS, UT, LT/ rhomboid major, L glut max and piriformis      Patients response to todays treatment:    [x]  LTRs  []  Muscle Relaxation  []  Pain Relief  []  Decreased Tinnitus  []  Decreased HAs [x]  Post needling soreness []  Increased ROM   []  Other:        x min Patient Education: [x] Review HEP from IE   - post needling care - added thread needle, open book and prone CS: gave pictures        Other Objective/Functional Measures: Therex initiated today - first FU post eval   STG 1 assessed    Deficit: R rotation     Pain Level (0-10 scale) post treatment: 1 \"post needling soreness. \"    ASSESSMENT/Changes in Function: Patient tolerated initiation of therex well - patient reports already doing thread the needle type exercise in yoga. Challenged by CS rotation mob in prone. 100% VC for form and technique for all newly introduced therex. Large LTR of B UT and LS (L greater than R ). Cont PM ES for post needling soreness. Patient will continue to benefit from skilled PT services to modify and progress therapeutic interventions, address functional mobility deficits, address ROM deficits, address strength deficits, analyze and address soft tissue restrictions, analyze and cue movement patterns, analyze and modify body mechanics/ergonomics and assess and modify postural abnormalities to attain remaining goals. [x]  See Plan of Care  []  See progress note/recertification  []  See Discharge Summary         Progress towards goals / Updated goals:  FIRST FU TREATMENT - CONT PER POT TO EST GOALS 12/22/2017   · Short Term Goals: To be accomplished in  1  weeks:  1.   Pt will be independent and compliant with HEP -All goals reviewed and progressing appropriately as of 12/22/2017   · Long Term Goals: To be accomplished in  8-12  treatments:  1. Patient will increase FOTO score to 81/100 for indications of increased functional mobility. 2.  Patient will demo full CS rotation for ease and safety with driving   3. Patient will report pain 5/10 at worst for improved QOL   4.  Patient will demo LS flexion with no LS extension moment for improved mechanics with bending to bath children     PLAN  [x]  Upgrade activities as tolerated     []  Continue plan of care  [x]  Update interventions per flow sheet       []  Discharge due to:_  [x]  Other:_assess response to first FU treatment  And IMT     Cristina Meyers, PT 12/22/2017

## 2017-12-29 ENCOUNTER — HOSPITAL ENCOUNTER (OUTPATIENT)
Dept: PHYSICAL THERAPY | Age: 42
Discharge: HOME | End: 2017-12-29
Payer: OTHER GOVERNMENT

## 2017-12-29 PROCEDURE — 97140 MANUAL THERAPY 1/> REGIONS: CPT

## 2017-12-29 PROCEDURE — 97014 ELECTRIC STIMULATION THERAPY: CPT

## 2017-12-29 PROCEDURE — 97110 THERAPEUTIC EXERCISES: CPT

## 2017-12-29 NOTE — PROGRESS NOTES
PT DAILY TREATMENT NOTE 8-    Patient Name: Diamond Molina  Date:2017  : 1975  [x]  Patient  Verified  Payor:  / Plan: [email or web address] [facility] [address] AND DEPENDENTS / Product Type:  /    In time:757 Out time:852  Total Treatment Time (min): 54  Visit #: 3 of -12    Treatment Area: Acute back pain [M54.9]    SUBJECTIVE  Pain Level (0-10 scale): 0- co tightness only   Any medication changes, allergies to medications, adverse drug reactions, diagnosis change, or new procedure performed?: [x] No    [] Yes (see summary sheet for update)  Subjective functional status/changes:   [] No changes reported  \"I wish I could say I was all loose and we didn't have to do this anymore, but its not there. \"    OBJECTIVE  Modality rationale: decrease pain and increase tissue extensibility to improve the patients ability to progress to increase function    Min Type Additional Details   10 [x] Estim: []Att   []Unatt        []TENS instruct                  []IFC  [x]Premod   []NMES                     []Other:  []w/US   []w/ice   [x]w/heat CS and LS  Position: semi reclined  Location: CS    []  Traction: [] Cervical       []Lumbar                       [] Prone          []Supine                       []Intermittent   []Continuous Lbs:  [] before manual  [] after manual    []  Ultrasound: []Continuous   [] Pulsed                           []1MHz   []3MHz Location:  W/cm2:    []  Iontophoresis with dexamethasone         Location: [] Take home patch   [] In clinic    []  Ice     []  heat  []  Ice massage Position:  Location:    []  Vasopneumatic Device Pressure:       [] lo [] med [] hi   Temperature: [] lo [] med [] hi   [x] Skin assessment post-treatment:  [x]intact []redness- no adverse reaction       []redness  adverse reaction:       11 min Therapeutic Exercise:  [x] See flow sheet :Initiated ther ex per flow sheet    Rationale: increase ROM, increase strength and improve flexibility  to improve the patients ability to decrease pain with activity and improve QOL      34 min Manual Therapy:  CS PA and UPA mobs for ROM  Technique:      [x] S/DTM []IASTM [x]PROM [x] Passive Stretching   [x]manual TPR  [x] TDN (see objective; actual needle insertion time not billed)  []Jt manipulation:Gr I [] II []  III [] IV[] V[]  Treatment/Area:  See below    Rationale:      decrease pain, increase ROM, increase tissue extensibility and decrease trigger points to improve patient's ability to increase activity tolerance with ADLs and taking care of children     Dry Needling Procedure Note    Dry Needle Session Number:  2    Procedure: An intramuscular manual therapy (dry needling) and a neuro-muscular re-education treatment was done to deactivate myofascial trigger points, with a 15/30 gauge solid filament needle, under aseptic technique. Indication(s): [x] Muscle spasms [x] Myalgia/Myositis  [] Muscle cramps      [] Muscle imbalances [] TMD (TMJ) [] Myofascial pain & dysfunction     [] Other: __    Chart reviewed for the following:  IKatrin PT, have reviewed the medical history, summary list and precautions/contraindications for [employer].      TIME OUT performed immediately prior to start of procedure:  800am (enter time the timeout was completed)  Katrin GUNTER PT, have performed the following reviews on Bonita Goldsmith prior to the start of the session:      [x] Patient was identified by name and date of birth    [x] Agreement on all muscles being treated was verified   [x] Purpose of dry needling, side effects, possible complications, risks and benefits were explained to the patient   [x] Procedure site(s) verified  [x] Patient was positioned for comfort and draped for privacy  [x] Informed Consent was signed (initial visit) and verified verbally (subsequent visits)  [x] Patient was instructed on the need to report the use of blood thinners and/or immunosuppressant medications  [x] How to respond to possible adverse effects of treatment  [x] Self treatment of post needling soreness: ice, heat (moist heat, heat wraps) and stretching  [x] Opportunity was given to ask any questions, all questions were answered            Treatment:  The following muscles were treated today:    Right:    Left: LS, UT, lat, CS PARASPINALS     Patients response to todays treatment:    [x]  LTRs  []  Muscle Relaxation  []  Pain Relief  []  Decreased Tinnitus  []  Decreased HAs [x]  Post needling soreness []  Increased ROM   []  Other:        x min Patient Education: [x] Review HEP - added BET        Other Objective/Functional Measures: Added BET therex to address core strength with transfers    Mild CS discomfort with BET d/t CS stabilizers binh   AROM CS left rotation ;dec 75%   PROM post needling and manual  CS rotation dec 10%     Pain Level (0-10 scale) post treatment: 1 \"post needling soreness. \"    ASSESSMENT/Changes in Function: Patient reports first IMT session aided in loosening tightness but not 100%. Patient has massage on Ricoe, but reported limited long term relief. PROM was singficantly improved after MT. Patient will continue to benefit from skilled PT services to modify and progress therapeutic interventions, address functional mobility deficits, address ROM deficits, address strength deficits, analyze and address soft tissue restrictions, analyze and cue movement patterns, analyze and modify body mechanics/ergonomics and assess and modify postural abnormalities to attain remaining goals. [x]  See Plan of Care  []  See progress note/recertification  []  See Discharge Summary         Progress towards goals / Updated goals: · Short Term Goals: To be accomplished in  1  weeks:  1. Pt will be independent and compliant with HEP -Goal progressing - HEP est with no questions. HEP will be modified and progressed as appropriate 12/29/17    · Long Term Goals:  To be accomplished in 8-12  treatments:  1. Patient will increase FOTO score to 81/100 for indications of increased functional mobility. 2.  Patient will demo full CS rotation for ease and safety with driving   3. Patient will report pain 5/10 at worst for improved QOL - goal progressing - IMT aiding to decrease pain/ tightness  12/29/17  4.  Patient will demo LS flexion with no LS extension moment for improved mechanics with bending to bath children     PLAN  [x]  Upgrade activities as tolerated     []  Continue plan of care  [x]  Update interventions per flow sheet       []  Discharge due to:_  [x]  Other:_Cont 1x per week - 2 more weeks scheduled   Address doyle Brooke, PT 12/29/2017

## 2018-01-08 ENCOUNTER — HOSPITAL ENCOUNTER (OUTPATIENT)
Dept: PHYSICAL THERAPY | Age: 43
Discharge: HOME | End: 2018-01-08
Payer: OTHER GOVERNMENT

## 2018-01-08 PROCEDURE — 97014 ELECTRIC STIMULATION THERAPY: CPT

## 2018-01-08 PROCEDURE — 97110 THERAPEUTIC EXERCISES: CPT

## 2018-01-08 PROCEDURE — 97140 MANUAL THERAPY 1/> REGIONS: CPT

## 2018-01-08 NOTE — PROGRESS NOTES
PT DAILY TREATMENT NOTE     Patient Name: Hailee Sal  Date:2018  : 1975  [x]  Patient  Verified  Payor:  / Plan: [employer] [facility] [address] AND DEPENDENTS / Product Type:  /    In time:931 Out time:1030  Total Treatment Time (min): 61  Visit #: 4 of -12    Treatment Area: Acute back pain [M54.9]    SUBJECTIVE  Pain Level (0-10 scale): 0   Any medication changes, allergies to medications, adverse drug reactions, diagnosis change, or new procedure performed?: [x] No    [] Yes (see summary sheet for update)  Subjective functional status/changes:   [] No changes reported  \"I am definitely better but my L shoulder is just tight. \"    OBJECTIVE  Modality rationale: decrease pain and increase tissue extensibility to improve the patients ability to progress to increase function    Min Type Additional Details   10 [x] Estim: []Att   []Unatt        []TENS instruct                  []IFC  [x]Premod   []NMES                     []Other:  []w/US   []w/ice   [x]w/heat CS and LS  Position: semi reclined  Location: CS    []  Traction: [] Cervical       []Lumbar                       [] Prone          []Supine                       []Intermittent   []Continuous Lbs:  [] before manual  [] after manual    []  Ultrasound: []Continuous   [] Pulsed                           []1MHz   []3MHz Location:  W/cm2:    []  Iontophoresis with dexamethasone         Location: [] Take home patch   [] In clinic    []  Ice     []  heat  []  Ice massage Position:  Location:    []  Vasopneumatic Device Pressure:       [] lo [] med [] hi   Temperature: [] lo [] med [] hi   [x] Skin assessment post-treatment:  [x]intact []redness- no adverse reaction       []redness  adverse reaction:       24 min Therapeutic Exercise:  [x] See flow sheet : 1 unit charged sec to no change    Rationale: increase ROM, increase strength and improve flexibility  to improve the patients ability to decrease pain with activity and improve QOL      25 min Manual Therapy:  CS PA and UPA mobs for ROM  , TS grade 5 manipulation in prone Technique:      [x] S/DTM []IASTM [x]PROM [x] Passive Stretching   [x]manual TPR  [x] TDN (see objective; actual needle insertion time not billed)  [x]Jt manipulation:Gr I [] II []  III [x] IV[] V[]  Treatment/Area:  See below    Rationale:      decrease pain, increase ROM, increase tissue extensibility and decrease trigger points to improve patient's ability to increase activity tolerance with ADLs and taking care of children     Dry Needling Procedure Note    Dry Needle Session Number:  4    Procedure: An intramuscular manual therapy (dry needling) and a neuro-muscular re-education treatment was done to deactivate myofascial trigger points, with a 15/30 gauge solid filament needle, under aseptic technique. Indication(s): [x] Muscle spasms [x] Myalgia/Myositis  [] Muscle cramps      [] Muscle imbalances [] TMD (TMJ) [] Myofascial pain & dysfunction     [] Other: __    Chart reviewed for the following:  IChris PT, have reviewed the medical history, summary list and precautions/contraindications for [employer].      TIME OUT performed immediately prior to start of procedure:  940am (enter time the timeout was completed)  Chris GUNTER PT, have performed the following reviews on Bonita Goldsmith prior to the start of the session:      [x] Patient was identified by name and date of birth    [x] Agreement on all muscles being treated was verified   [x] Purpose of dry needling, side effects, possible complications, risks and benefits were explained to the patient   [x] Procedure site(s) verified  [x] Patient was positioned for comfort and draped for privacy  [x] Informed Consent was signed (initial visit) and verified verbally (subsequent visits)  [x] Patient was instructed on the need to report the use of blood thinners and/or immunosuppressant medications  [x] How to respond to possible adverse effects of treatment  4 Self treatment of post needling soreness: ice, heat (moist heat, heat wraps) and stretching  [x] Opportunity was given to ask any questions, all questions were answered            Treatment:  The following muscles were treated today:    Right:    Left: LS, UT, lat, CS PARASPINALS , scalenes middle and ant, SCM      Patients response to todays treatment:    [x]  LTRs  []  Muscle Relaxation  []  Pain Relief  []  Decreased Tinnitus  []  Decreased HAs [x]  Post needling soreness []  Increased ROM   []  Other:        x min Patient Education: [x] Review HEP       Other Objective/Functional Measures:     AROM CS left rotation: dec 25% - pre needling , dec 5% post needling    + cavitation with TS mobs      Pain Level (0-10 scale) post treatment: 1 \"post needling soreness. \"    ASSESSMENT/Changes in Function: Patient demo significant improvement in ROM. Even more improvement after release with IMT. Patient had good tolerance to newly needled muscles with referral pattern to posterior scap and ant clavicle. Patient educated on upcoming reassessment next week. Patient will continue to benefit from skilled PT services to modify and progress therapeutic interventions, address functional mobility deficits, address ROM deficits, address strength deficits, analyze and address soft tissue restrictions, analyze and cue movement patterns, analyze and modify body mechanics/ergonomics and assess and modify postural abnormalities to attain remaining goals. [x]  See Plan of Care  []  See progress note/recertification  []  See Discharge Summary         Progress towards goals / Updated goals: All goals reviewed and progressing appropriately as of 1/8/2018   · Short Term Goals: To be accomplished in  1  weeks:  1. Pt will be independent and compliant with HEP -Goal progressing - HEP est with no questions. HEP will be modified and progressed as appropriate 12/29/17    · Long Term Goals:  To be accomplished in  8-12  treatments:  1. Patient will increase FOTO score to 81/100 for indications of increased functional mobility. 2.  Patient will demo full CS rotation for ease and safety with driving   3. Patient will report pain 5/10 at worst for improved QOL - goal progressing - IMT aiding to decrease pain/ tightness  12/29/17  4.  Patient will demo LS flexion with no LS extension moment for improved mechanics with bending to bath children     PLAN  [x]  Upgrade activities as tolerated     []  Continue plan of care  [x]  Update interventions per flow sheet       []  Discharge due to:_  [x]  Other:_PN due NV - last scheduled : cont vs DC (1 every other week?)    Lashon Daniel, PT 1/8/2018

## 2018-01-15 ENCOUNTER — HOSPITAL ENCOUNTER (OUTPATIENT)
Dept: PHYSICAL THERAPY | Age: 43
Discharge: HOME | End: 2018-01-15
Payer: OTHER GOVERNMENT

## 2018-01-15 PROCEDURE — 97140 MANUAL THERAPY 1/> REGIONS: CPT

## 2018-01-15 PROCEDURE — 97110 THERAPEUTIC EXERCISES: CPT

## 2018-01-15 PROCEDURE — 97014 ELECTRIC STIMULATION THERAPY: CPT

## 2018-01-15 NOTE — PROGRESS NOTES
PT DAILY TREATMENT NOTE 8-    Patient Name: Maxine Rosenthal  Date:1/15/2018  : 1975  [x]  Patient  Verified  Payor:  / Plan: [employer] [facility] [address] AND DEPENDENTS / Product Type:  /    In time:900 Out time:1006  Total Treatment Time (min): 77  Visit #: 5 of 4-12    Treatment Area: Acute back pain [M54.9]    SUBJECTIVE  Pain Level (0-10 scale): 0  Any medication changes, allergies to medications, adverse drug reactions, diagnosis change, or new procedure performed?: [x] No    [] Yes (see summary sheet for update)  Subjective functional status/changes:   [] No changes reported  \"The right side is worse. I think I just need regular massages and stretching. And a new pillow. \"    OBJECTIVE  Modality rationale: decrease pain and increase tissue extensibility to improve the patients ability to progress to increase function    Min Type Additional Details   10 [x] Estim: []Att   []Unatt        []TENS instruct                  []IFC  [x]Premod   []NMES                     []Other:  []w/US   []w/ice   [x]w/heat CS and TS  Position: semi reclined  Location: CS and TS     []  Traction: [] Cervical       []Lumbar                       [] Prone          []Supine                       []Intermittent   []Continuous Lbs:  [] before manual  [] after manual    []  Ultrasound: []Continuous   [] Pulsed                           []1MHz   []3MHz Location:  W/cm2:    []  Iontophoresis with dexamethasone         Location: [] Take home patch   [] In clinic    []  Ice     []  heat  []  Ice massage Position:  Location:    []  Vasopneumatic Device Pressure:       [] lo [] med [] hi   Temperature: [] lo [] med [] hi   [x] Skin assessment post-treatment:  [x]intact []redness- no adverse reaction       []redness  adverse reaction:        20 min Therapeutic Exercise:  [x] See flow sheet : REASSESS , COMPLETED FOTO WITH PATIENT    Rationale: increase ROM, increase strength and improve flexibility  to improve the patients ability to decrease pain with activity and improve QOL      36 min Manual Therapy:  CS PA and UPA mobs for ROM  , TS grade 5 manipulation in prone , SOR  Technique:      [x] S/DTM []IASTM [x]PROM [x] Passive Stretching   [x]manual TPR  [x] TDN (see objective; actual needle insertion time not billed)  [x]Jt manipulation:Gr I [] II []  III [x] IV[] V[]  Treatment/Area:  See below    Rationale:      decrease pain, increase ROM, increase tissue extensibility and decrease trigger points to improve patient's ability to increase activity tolerance with ADLs and 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 Indication(s): [x] Muscle spasms [x] Myalgia/Myositis  [] Muscle cramps      [] Muscle imbalances [] TMD (TMJ) [] Myofascial pain & dysfunction     [] Other: __    Chart reviewed for the following:  IAbel PT, have reviewed the medical history, summary list and precautions/contraindications for [employer].      TIME OUT performed immediately prior to start of procedure:  915am (enter time the timeout was completed)  Abel GUNTER PT, have performed the following reviews on Bonita Goldsmith prior to the start of the session:      [x] Patient was identified by name and date of birth    [x] Agreement on all muscles being treated was verified   [x] Purpose of dry needling, side effects, possible complications, risks and benefits were explained to the patient   [x] Procedure site(s) verified  [x] Patient was positioned for comfort and draped for privacy  [x] Informed Consent was signed (initial visit) and verified verbally (subsequent visits)  [x] Patient was instructed on the need to report the use of blood thinners and/or immunosuppressant medications  [x] How to respond to possible adverse effects of treatment  4 Self treatment of post needling soreness: ice, heat (moist heat, heat wraps) and stretching  [x] Opportunity was given to ask any questions, all questions were answered            Treatment:  The following muscles were treated today:    Right: UT CS paraspinal    Left:  UT, CS paraspinal      Patients response to todays treatment:    [x]  LTRs  []  Muscle Relaxation  []  Pain Relief  []  Decreased Tinnitus  []  Decreased HAs [x]  Post needling soreness []  Increased ROM   []  Other:        x min Patient Education: [x] Review HEP       Other Objective/Functional Measures:     Goals assessed for DC   Pain at best 0, at worst 1  Subjective % improvement 99%  Objective:    CS AROM WNL all directions - mild tightness L    LS flexion posture WNL without compensation   Improvements: ROM, pain, sleep tolerance, lifting, bending   Deficits general tightness in am        Pain Level (0-10 scale) post treatment: 1 \"post needling soreness. \"    ASSESSMENT/Changes in Function: SEE DC  [x]  See Discharge Summary         Progress towards goals / Updated goals: · Short Term Goals: To be accomplished in  1  weeks:  1. Pt will be independent and compliant with HEP -Goal met - patient reports compliance with HEP   · Long Term Goals: To be accomplished in  8-12  treatments:  1. Patient will increase FOTO score to 81/100 for indications of increased functional mobility goal met at 83/100.    2.  Patient will demo full CS rotation for ease and safety with driving  Goal met at [facility]   3. Patient will report pain 5/10 at worst for improved QOL - goal met at 1/10   4.  Patient will demo LS flexion with no LS extension moment for improved mechanics with bending to bath children goal met at normalized posture      PLAN    [x]  [place] 66, PT 1/15/2018

## 2018-01-15 NOTE — PROGRESS NOTES
[address] [facility] AT [address] [facility] Negar Sandoval  Phone: [phone] Fax: [phone]  DISCHARGE NOTE   Patient Name: Vivi Barclay : 1975   Treatment/Medical Diagnosis: Acute back pain [M54.9]   Referral Source: Reine Litten, MD     Date of Initial Visit: 12/15/17 Attended Visits: 5 Missed Visits: 0     SUMMARY OF TREATMENT  Pt is a 38y.o. year old female who presents with LS and CS pain and tightness with L calf pain starting preliminarily multiple years ago but with exacerbated in Mid Nov by long car ride causing 3 day severe flareup. Treatment included IMT/ dry needling for myofascial release, progressive therex and ES for m relaxation. CURRENT STATUS  Patient has progressed well with treatment as above. Overall improvements in pain and ROM with continued co stiffness in CS and intermittent LS. Assessment as follows:  Pain at best 0, at worst 1  Subjective % improvement 99%  Objective:                         CS AROM WNL all directions - mild tightness L                         LS flexion posture WNL without compensation   Improvements: ROM, pain, sleep tolerance, lifting, bending   Deficits general tightness in am     Progress towards goals / Updated goals: · Short Term Goals: To be accomplished in  1  weeks:  1.  Pt will be independent and compliant with HEP -Goal met - patient reports compliance with HEP   · Long Term Goals: To be accomplished in  8-12  treatments:  1.  Patient will increase FOTO score to 81/100 for indications of increased functional mobility goal met at 83/100.    2.  Patient will demo full CS rotation for ease and safety with driving  Goal met at [facility]   3. Patient will report pain 5/10 at worst for improved QOL - goal met at 1/10   4.  Patient will demo LS flexion with no LS extension moment for improved mechanics with bending to bath children goal met at normalized posture      G-Codes: NA  RECOMMENDATIONS  Patient would benefit from DC to HEP at this time for self management. Patient encouraged to return in the future as needed with new referral   If you have any questions/comments please contact us directly at [phone]. Thank you for allowing us to assist in the care of your patient. Therapist Signature: Chris Sifuentes PT Date: 1/15/2018     Time: 1012am    NOTE TO PHYSICIAN:  PLEASE COMPLETE THE ORDERS BELOW AND FAX TO   [facility]: [phone]. If you are unable to process this request in 24 hours please contact our office: [phone].  ___ I have read the above report and request that my patient continue as recommended.   ___ I have read the above report and request that my patient continue therapy with the following changes/special instructions:_________________________________________________________   ___ I have read the above report and request that my patient be discharged from therapy.      Physician Signature:        Date:       Time:

## 2018-04-11 ENCOUNTER — HOSPITAL ENCOUNTER (OUTPATIENT)
Dept: MAMMOGRAPHY | Age: 43
Discharge: HOME | End: 2018-04-11
Attending: OBSTETRICS & GYNECOLOGY
Payer: OTHER GOVERNMENT

## 2018-04-11 DIAGNOSIS — Z12.31 ENCOUNTER FOR SCREENING MAMMOGRAM FOR BREAST CANCER: ICD-10-CM

## 2018-04-11 PROCEDURE — 77063 BREAST TOMOSYNTHESIS BI: CPT

## 2018-07-09 ENCOUNTER — HOSPITAL ENCOUNTER (OUTPATIENT)
Dept: LAB | Age: 43
Discharge: HOME | End: 2018-07-09
Payer: OTHER GOVERNMENT

## 2018-07-09 ENCOUNTER — OFFICE VISIT (OUTPATIENT)
Dept: INTERNAL MEDICINE CLINIC | Age: 43
End: 2018-07-09

## 2018-07-09 VITALS
TEMPERATURE: 96.9 F | WEIGHT: 157.63 LBS | HEIGHT: 66 IN | SYSTOLIC BLOOD PRESSURE: 114 MMHG | RESPIRATION RATE: 16 BRPM | OXYGEN SATURATION: 100 % | BODY MASS INDEX: 25.33 KG/M2 | HEART RATE: 64 BPM | DIASTOLIC BLOOD PRESSURE: 64 MMHG

## 2018-07-09 DIAGNOSIS — Z78.9 VEGAN DIET: ICD-10-CM

## 2018-07-09 DIAGNOSIS — E55.9 VITAMIN D DEFICIENCY: ICD-10-CM

## 2018-07-09 DIAGNOSIS — Z00.00 ROUTINE GENERAL MEDICAL EXAMINATION AT A HEALTH CARE FACILITY: ICD-10-CM

## 2018-07-09 DIAGNOSIS — Z00.00 ROUTINE GENERAL MEDICAL EXAMINATION AT A HEALTH CARE FACILITY: Primary | ICD-10-CM

## 2018-07-09 LAB
ALBUMIN SERPL-MCNC: 4.3 G/DL (ref 3.4–5)
ALBUMIN/GLOB SERPL: 1.6 {RATIO} (ref 0.8–1.7)
ALP SERPL-CCNC: 44 U/L (ref 45–117)
ALT SERPL-CCNC: 21 U/L (ref 13–56)
ANION GAP SERPL CALC-SCNC: 6 MMOL/L (ref 3–18)
APPEARANCE UR: CLEAR
AST SERPL-CCNC: 14 U/L (ref 15–37)
BILIRUB SERPL-MCNC: 0.7 MG/DL (ref 0.2–1)
BILIRUB UR QL: NEGATIVE
BUN SERPL-MCNC: 12 MG/DL (ref 7–18)
BUN/CREAT SERPL: 14 (ref 12–20)
CALCIUM SERPL-MCNC: 8.9 MG/DL (ref 8.5–10.1)
CHLORIDE SERPL-SCNC: 106 MMOL/L (ref 100–108)
CHOLEST SERPL-MCNC: 169 MG/DL
CO2 SERPL-SCNC: 29 MMOL/L (ref 21–32)
COLOR UR: YELLOW
CREAT SERPL-MCNC: 0.83 MG/DL (ref 0.6–1.3)
ERYTHROCYTE [DISTWIDTH] IN BLOOD BY AUTOMATED COUNT: 13.2 % (ref 11.6–14.5)
GLOBULIN SER CALC-MCNC: 2.7 G/DL (ref 2–4)
GLUCOSE SERPL-MCNC: 88 MG/DL (ref 74–99)
GLUCOSE UR STRIP.AUTO-MCNC: NEGATIVE MG/DL
HCT VFR BLD AUTO: 43.4 % (ref 35–45)
HDLC SERPL-MCNC: 77 MG/DL (ref 40–60)
HDLC SERPL: 2.2 {RATIO} (ref 0–5)
HGB BLD-MCNC: 14.7 G/DL (ref 12–16)
HGB UR QL STRIP: NEGATIVE
KETONES UR QL STRIP.AUTO: NEGATIVE MG/DL
LDLC SERPL CALC-MCNC: 81.8 MG/DL (ref 0–100)
LEUKOCYTE ESTERASE UR QL STRIP.AUTO: NEGATIVE
LIPID PROFILE,FLP: ABNORMAL
MCH RBC QN AUTO: 31.5 PG (ref 24–34)
MCHC RBC AUTO-ENTMCNC: 33.9 G/DL (ref 31–37)
MCV RBC AUTO: 92.9 FL (ref 74–97)
NITRITE UR QL STRIP.AUTO: NEGATIVE
PH UR STRIP: 8.5 [PH] (ref 5–8)
PLATELET # BLD AUTO: 197 K/UL (ref 135–420)
PMV BLD AUTO: 12 FL (ref 9.2–11.8)
POTASSIUM SERPL-SCNC: 4.7 MMOL/L (ref 3.5–5.5)
PROT SERPL-MCNC: 7 G/DL (ref 6.4–8.2)
PROT UR STRIP-MCNC: NEGATIVE MG/DL
RBC # BLD AUTO: 4.67 M/UL (ref 4.2–5.3)
SODIUM SERPL-SCNC: 141 MMOL/L (ref 136–145)
SP GR UR REFRACTOMETRY: 1.01 (ref 1–1.03)
TRIGL SERPL-MCNC: 51 MG/DL (ref ?–150)
TSH SERPL DL<=0.05 MIU/L-ACNC: 2.9 UIU/ML (ref 0.36–3.74)
UROBILINOGEN UR QL STRIP.AUTO: 0.2 EU/DL (ref 0.2–1)
VIT B12 SERPL-MCNC: 424 PG/ML (ref 211–911)
VLDLC SERPL CALC-MCNC: 10.2 MG/DL
WBC # BLD AUTO: 3.9 K/UL (ref 4.6–13.2)

## 2018-07-09 PROCEDURE — 36415 COLL VENOUS BLD VENIPUNCTURE: CPT | Performed by: INTERNAL MEDICINE

## 2018-07-09 PROCEDURE — 84443 ASSAY THYROID STIM HORMONE: CPT | Performed by: INTERNAL MEDICINE

## 2018-07-09 PROCEDURE — 80053 COMPREHEN METABOLIC PANEL: CPT | Performed by: INTERNAL MEDICINE

## 2018-07-09 PROCEDURE — 81003 URINALYSIS AUTO W/O SCOPE: CPT | Performed by: INTERNAL MEDICINE

## 2018-07-09 PROCEDURE — 82607 VITAMIN B-12: CPT | Performed by: INTERNAL MEDICINE

## 2018-07-09 PROCEDURE — 82306 VITAMIN D 25 HYDROXY: CPT | Performed by: INTERNAL MEDICINE

## 2018-07-09 PROCEDURE — 80061 LIPID PANEL: CPT | Performed by: INTERNAL MEDICINE

## 2018-07-09 PROCEDURE — 85027 COMPLETE CBC AUTOMATED: CPT | Performed by: INTERNAL MEDICINE

## 2018-07-09 RX ORDER — LANOLIN ALCOHOL/MO/W.PET/CERES
1000 CREAM (GRAM) TOPICAL DAILY
COMMUNITY

## 2018-07-09 NOTE — PROGRESS NOTES
ROOM # 2    Bonita John presents today for   Chief Complaint   Patient presents with    Complete Physical       Bonita Goldsmith preferred language for health care discussion is english/other. Is someone accompanying this pt? no    Is the patient using any DME equipment during OV? no    Depression Screening:  PHQ over the last two weeks 7/9/2018 8/14/2017 3/21/2017 12/1/2016 10/20/2015 10/8/2014   Little interest or pleasure in doing things Not at all Not at all Not at all Not at all Not at all Not at all   Feeling down, depressed or hopeless Not at all Not at all Not at all Not at all Not at all Not at all   Total Score PHQ 2 0 0 0 0 0 0       Learning Assessment:  Learning Assessment 10/8/2014   PRIMARY LEARNER Patient   HIGHEST LEVEL OF EDUCATION - PRIMARY LEARNER  6 YEARS [address] CAREGIVER No   PRIMARY LANGUAGE ENGLISH   LEARNER PREFERENCE PRIMARY OTHER (COMMENT)   ANSWERED BY self   RELATIONSHIP SELF       Abuse Screening:  Abuse Screening Questionnaire 7/9/2018 10/8/2014   Do you ever feel afraid of your partner? N N   Are you in a relationship with someone who physically or mentally threatens you? N N   Is it safe for you to go home? Y Y       Fall Risk  No flowsheet data found. Health Maintenance reviewed and discussed per provider. Yes    Tonya Alba is due for There are no preventive care reminders to display for this patient. Please order/place referral if appropriate. Advance Directive:  1. Do you have an advance directive in place? Patient Reply: no    2. If not, would you like material regarding how to put one in place? Patient Reply: no    Coordination of Care:  1. Have you been to the ER, urgent care clinic since your last visit? Hospitalized since your last visit? no    2. Have you seen or consulted any other health care providers outside of the [address] [place] [address] since your last visit? Include any pap smears or colon screening. no

## 2018-07-09 NOTE — MR AVS SNAPSHOT
[facility] [place] 
 
 
 [address] [facility] [address] [address] 
[phone] Patient: Salbador Trujillo MRN: [id number] XNY:[id number] Visit Information Date & Time Provider Department Dept. Phone Encounter #  
 7/9/2018  7:30 AM Tello Ovalle MD [address] [phone] [id number] Follow-up Instructions Return in about 1 year (around 7/9/2019) for CPE. Upcoming Health Maintenance Date Due Influenza Age 5 to Adult 8/1/2018 PAP AKA CERVICAL CYTOLOGY 3/21/2020 DTaP/Tdap/Td series (2 - Td) 11/21/2022 Allergies as of 7/9/2018  Review Complete On: 7/9/2018 By: Tello Ovalle MD  
 No Known Allergies Current Immunizations  Reviewed on 3/21/2017 Name Date Influenza Vaccine (Quad) PF 10/20/2015 Influenza Vaccine PF 10/8/2014 10:54 AM  
  
 Not reviewed this visit You Were Diagnosed With   
  
 Codes Comments Routine general medical examination at a health care facility    -  Primary ICD-10-CM: Z00.00 ICD-9-CM: V70.0 Vegan diet     ICD-10-CM: Z78.9 ICD-9-CM: V49.89 Vitamin D deficiency     ICD-10-CM: E55.9 ICD-9-CM: 268.9 Vitals BP Pulse Temp Resp Height(growth percentile) Weight(growth percentile) 114/64 (BP 1 Location: Right arm, BP Patient Position: Sitting) 64 96.9 °F (36.1 °C) (Oral) 16 5' 6\" (1.676 m) 157 lb 10.1 oz (71.5 kg) LMP SpO2 BMI OB Status Smoking Status 06/12/2018 100% 25.44 kg/m2 Having regular periods Never Smoker Vitals History BMI and BSA Data Body Mass Index Body Surface Area  
 25.44 kg/m 2 1.82 m 2 Preferred Pharmacy Pharmacy Name Phone [facility] [address] [phone] Your Updated Medication List  
  
   
This list is accurate as of 7/9/18  7:45 AM.  Always use your most recent med list.  
  
  
  
  
 cyanocobalamin 1,000 mcg tablet Take 1,000 mcg by mouth daily. Indications: twice per week PROBIOTIC 4X 10-15 mg Tbec Generic drug:  B.infantis-B.ani-B.long-B.bifi Take  by mouth. Follow-up Instructions Return in about 1 year (around 7/9/2019) for CPE. To-Do List   
 07/09/2018 Lab:  CBC W/O DIFF   
  
 07/09/2018 Lab:  LIPID PANEL   
  
 07/09/2018 Lab:  METABOLIC PANEL, COMPREHENSIVE   
  
 07/09/2018 Lab:  TSH 3RD GENERATION   
  
 07/09/2018 Lab:  URINALYSIS W/ RFLX MICROSCOPIC   
  
 07/09/2018 Lab:  VITAMIN B12   
  
 07/09/2018 Lab:  VITAMIN D, 25 HYDROXY Patient Instructions 1) follow-up in 1 year or sooner if worsening symptoms. Introducing [facility]! [facility] introduces [employer] patient portal. Now you can access parts of your medical record, email your doctor's office, and request medication refills online. 1. In your internet browser, go to [email or web address]. [email or web address] 2. Click on the First Time User? Click Here link in the Sign In box. You will see the New Member Sign Up page. 3. Enter your Plunify Access Code exactly as it appears below. You will not need to use this code after youve completed the sign-up process. If you do not sign up before the expiration date, you must request a new code. · [employer] Access Code: [email or web address] Expires: 10/7/2018  7:45 AM 
 
4. Enter the last four digits of your Social Security Number (xxxx) and Date of Birth (mm/dd/yyyy) as indicated and click Submit. You will be taken to the next sign-up page. 5. Create a Plunify ID. This will be your Plunify login ID and cannot be changed, so think of one that is secure and easy to remember. 6. Create a [employer]ify password. You can change your password at any time. 7. Enter your Password Reset Question and Answer. This can be used at a later time if you forget your password. 8. Enter your e-mail address.  You will receive e-mail notification when new information is available in [employer]. 9. Click Sign Up. You can now view and download portions of your medical record. 10. Click the Download Summary menu link to download a portable copy of your medical information. If you have questions, please visit the Frequently Asked Questions section of the [employer] website. Remember, [employer] is NOT to be used for urgent needs. For medical emergencies, dial 911. Now available from your iPhone and Android! Please provide this summary of care documentation to your next provider. Your primary care clinician is listed as Meghna Banks. If you have any questions after today's visit, please call [phone].

## 2018-07-09 NOTE — PROGRESS NOTES
Chief Complaint   Patient presents with    Complete Physical         HPI:     Daniela Lee is a 38 y.o.  female with history of vitamin D deficieny  who presents for complete physical exam. She has recently started a vegan diet. She is taking vitamin B12 to supplement this. She denies any chest pain, shortness of breath, abdominal pain, headaches or dizziness. Past Medical History:   Diagnosis Date    Genital herpes, unspecified     valtrex at 36wks (she has never had a flare)    Vitamin D deficiency 2017       History reviewed. No pertinent surgical history. MEDICATION ALLERGIES/INTOLERANCES:   No Known Allergies          CURRENT MEDICATIONS:    Current Outpatient Prescriptions   Medication Sig    cyanocobalamin 1,000 mcg tablet Take 1,000 mcg by mouth daily. Indications: twice per week    B.infantis-B.ani-B.long-B.bifi (PROBIOTIC 4X) 10-15 mg TbEC Take  by mouth. No current facility-administered medications for this visit. Health Maintenance   Topic Date Due    Influenza Age 5 to Adult  08/01/2018    PAP AKA CERVICAL CYTOLOGY  03/21/2020    DTaP/Tdap/Td series (2 - Td) 11/21/2022         FAMILY HISTORY:   Family History   Problem Relation Age of Onset    Cancer Paternal Grandmother      breast cancer and mets    Breast Cancer Paternal Grandmother [de-identified]     age approximate    Hypertension Father     Cancer Sister      cervical cancer screening       SOCIAL HISTORY:   She  reports that she has never smoked.  She has never used smokeless tobacco.  She  reports that she drinks about 0.5 oz of alcohol per week       [address]:  Mammogram: 2018  Pap smear: 2018    ROS:   General: negative for - chills, fatigue, fever, weight loss or weight gain, night sweats  HEENT: negative for - no sore throat, nasal congestion, vision problems or ear problems  Resp: negative for - cough, shortness of breath or wheezing  CV: negative for - chest pain, palpitations, orthopnea or PND,  GI: negative for - abdominal pain, change in bowel habits, constipation, diarrhea, blood or black tarry stools, or nausea/vomiting  : negative for - dysuria, hematuria, incontinence, pelvic pain or vulvar/vaginal symptoms  Heme: negative for -excessive bleeding or bruising  Endo: negative for - hot flashes, polydipsia/polyuria or hot or cold intolerance  MSK: negative for - joint pain, joint swelling or muscle pain  Neuro: negative for - numbness, tingling, headache or dizziness  Derm: negative for - dry skin, hair changes, rash or skin lesion changes  Psych: negative for - anxiety, depression, irritability or mood swings or positive for  insomnia    OBJECTIVE:  PHYSICAL EXAM: Vitals:   Vitals:    07/09/18 0721   BP: 114/64   Pulse: 64   Resp: 16   Temp: 96.9 °F (36.1 °C)   TempSrc: Oral   SpO2: 100%   Weight: 157 lb 10.1 oz (71.5 kg)   Height: 5' 6\" (1.676 m)     Generally: Pleasant female in no acute distress    HEENT exam: Head: atraumatic     Eyes: Pupils equally round and reactive to light, Fundoscopic exam is                       normal               Ears: bilaterally: Normal tympanic membrane, no erythema or exudate,                         normal light Reflex    Nares: moist mucosa, no erythema               Mouth: clear, no erythema or exudate     Neck: supple, no lymphadenopathy, negative thyromegaly, negative                                   carotid bruits bilaterally    Cardiac exam: regular, rate, and rhythm. No murmurs, gallops, or rubs. Normal S1 and S2.    Pulmonary exam: Clear to ausculation bilaterally    Abdominal exam: Positive bowel sounds in all four quadrants, soft, nondistended, nontender. No hepatosplenomegaly. Extremities: 2+ dorsalis pedis bilaterally. No pedal edema bilaterally. Musculoskeletal exam: 5 out of 5 strength in upper and lower extremities bilaterally. Good range of motion in upper and lower extremities.  2 out of 2 reflexes in upper and lower extremities bilaterally. Neurological exam: Cranial nerves II-XII all intact. Normal sensation in upper and lower extremities. Normal gait. Skin:various moles that are benign. She will make appt. To dermatology for skin check. LABS/RADIOLOGICAL TESTS:   Component      Latest Ref Rng & Units 3/21/2017 3/21/2017           8:22 AM  8:22 AM   Sodium      136 - 145 mmol/L     Potassium      3.5 - 5.5 mmol/L     Chloride      100 - 108 mmol/L     CO2      21 - 32 mmol/L     Anion gap      3.0 - 18 mmol/L     Glucose      74 - 99 mg/dL     BUN      7.0 - 18 MG/DL     Creatinine      0.6 - 1.3 MG/DL     BUN/Creatinine ratio      12 - 20       GFR est AA      >60 ml/min/1.73m2     GFR est non-AA      >60 ml/min/1.73m2     Calcium      8.5 - 10.1 MG/DL     Bilirubin, total      0.2 - 1.0 MG/DL     ALT (SGPT)      13 - 56 U/L     AST      15 - 37 U/L     Alk.  phosphatase      45 - 117 U/L     Protein, total      6.4 - 8.2 g/dL     Albumin      3.4 - 5.0 g/dL     Globulin      2.0 - 4.0 g/dL     A-G Ratio      0.8 - 1.7       Color           Appearance           Specific gravity      1.005 - 1.030       pH (UA)      5.0 - 8.0       Protein      NEG mg/dL     Glucose      NEG mg/dL     Ketone      NEG mg/dL     Bilirubin      NEG       Blood      NEG       Urobilinogen      0.2 - 1.0 EU/dL     Nitrites      NEG       Leukocyte Esterase      NEG       WBC      4.6 - 13.2 K/uL     RBC      4.20 - 5.30 M/uL     HGB      12.0 - 16.0 g/dL     HCT      35.0 - 45.0 %     MCV      74.0 - 97.0 FL     MCH      24.0 - 34.0 PG     MCHC      31.0 - 37.0 g/dL     RDW      11.6 - 14.5 %     PLATELET      990 - 001 K/uL     MPV      9.2 - 11.8 FL     Cholesterol, total      <200 MG/DL     Triglyceride      <150 MG/DL     HDL Cholesterol      40 - 60 MG/DL     LDL, calculated      0 - 100 MG/DL     VLDL, calculated      MG/DL     CHOL/HDL Ratio      0 - 5.0       WBC      0 - 4 /hpf  8 to 10   RBC      0 - 5 /hpf  0 Epithelial cells      0 - 5 /lpf  1+   Bacteria      NEG /hpf  1+ (A)   Test Description:       BRCA 1 T BRCA 2 GENES TEST CODE [id number]    Reference Lab:       [email or web address]    Results:       (NOTE)    TSH      0.36 - 3.74 uIU/mL       Component      Latest Ref Rng & Units 3/21/2017 3/21/2017 3/21/2017 3/21/2017           8:22 AM  8:22 AM  8:22 AM  8:22 AM   Sodium      136 - 145 mmol/L    138   Potassium      3.5 - 5.5 mmol/L    3.7   Chloride      100 - 108 mmol/L    102   CO2      21 - 32 mmol/L    26   Anion gap      3.0 - 18 mmol/L    10   Glucose      74 - 99 mg/dL    77   BUN      7.0 - 18 MG/DL    15   Creatinine      0.6 - 1.3 MG/DL    0.81   BUN/Creatinine ratio      12 - 20      19   GFR est AA      >60 ml/min/1.73m2    >60   GFR est non-AA      >60 ml/min/1.73m2    >60   Calcium      8.5 - 10.1 MG/DL    9.0   Bilirubin, total      0.2 - 1.0 MG/DL    0.5   ALT (SGPT)      13 - 56 U/L    25   AST      15 - 37 U/L    15   Alk.  phosphatase      45 - 117 U/L    49   Protein, total      6.4 - 8.2 g/dL    7.5   Albumin      3.4 - 5.0 g/dL    4.4   Globulin      2.0 - 4.0 g/dL    3.1   A-G Ratio      0.8 - 1.7      1.4   Color       YELLOW      Appearance       CLEAR      Specific gravity      1.005 - 1.030   1.006      pH (UA)      5.0 - 8.0   7.5      Protein      NEG mg/dL NEGATIVE      Glucose      NEG mg/dL NEGATIVE      Ketone      NEG mg/dL NEGATIVE      Bilirubin      NEG   NEGATIVE      Blood      NEG   NEGATIVE      Urobilinogen      0.2 - 1.0 EU/dL 0.2      Nitrites      NEG   NEGATIVE      Leukocyte Esterase      NEG   MODERATE (A)      WBC      4.6 - 13.2 K/uL       RBC      4.20 - 5.30 M/uL       HGB      12.0 - 16.0 g/dL       HCT      35.0 - 45.0 %       MCV      74.0 - 97.0 FL       MCH      24.0 - 34.0 PG       MCHC      31.0 - 37.0 g/dL       RDW      11.6 - 14.5 %       PLATELET      094 - 780 K/uL       MPV      9.2 - 11.8 FL       Cholesterol, total      <200 MG/DL   196    Triglyceride <150 MG/DL   74    HDL Cholesterol      40 - 60 MG/DL   79 (H)    LDL, calculated      0 - 100 MG/DL   102.2 (H)    VLDL, calculated      MG/DL   14.8    CHOL/HDL Ratio      0 - 5.0     2.5    WBC      0 - 4 /hpf       RBC      0 - 5 /hpf       Epithelial cells      0 - 5 /lpf       Bacteria      NEG /hpf       Test Description:             Reference Lab:             Results:             TSH      0.36 - 3.74 uIU/mL  3.12       Component      Latest Ref Rng & Units 3/21/2017           8:22 AM   Sodium      136 - 145 mmol/L    Potassium      3.5 - 5.5 mmol/L    Chloride      100 - 108 mmol/L    CO2      21 - 32 mmol/L    Anion gap      3.0 - 18 mmol/L    Glucose      74 - 99 mg/dL    BUN      7.0 - 18 MG/DL    Creatinine      0.6 - 1.3 MG/DL    BUN/Creatinine ratio      12 - 20      GFR est AA      >60 ml/min/1.73m2    GFR est non-AA      >60 ml/min/1.73m2    Calcium      8.5 - 10.1 MG/DL    Bilirubin, total      0.2 - 1.0 MG/DL    ALT (SGPT)      13 - 56 U/L    AST      15 - 37 U/L    Alk.  phosphatase      45 - 117 U/L    Protein, total      6.4 - 8.2 g/dL    Albumin      3.4 - 5.0 g/dL    Globulin      2.0 - 4.0 g/dL    A-G Ratio      0.8 - 1.7      Color          Appearance          Specific gravity      1.005 - 1.030      pH (UA)      5.0 - 8.0      Protein      NEG mg/dL    Glucose      NEG mg/dL    Ketone      NEG mg/dL    Bilirubin      NEG      Blood      NEG      Urobilinogen      0.2 - 1.0 EU/dL    Nitrites      NEG      Leukocyte Esterase      NEG      WBC      4.6 - 13.2 K/uL 6.0   RBC      4.20 - 5.30 M/uL 4.68   HGB      12.0 - 16.0 g/dL 14.8   HCT      35.0 - 45.0 % 43.4   MCV      74.0 - 97.0 FL 92.7   MCH      24.0 - 34.0 PG 31.6   MCHC      31.0 - 37.0 g/dL 34.1   RDW      11.6 - 14.5 % 13.2   PLATELET      167 - 695 K/uL 191   MPV      9.2 - 11.8 FL 11.5   Cholesterol, total      <200 MG/DL    Triglyceride      <150 MG/DL    HDL Cholesterol      40 - 60 MG/DL    LDL, calculated      0 - 100 MG/DL VLDL, calculated      MG/DL    CHOL/HDL Ratio      0 - 5.0      WBC      0 - 4 /hpf    RBC      0 - 5 /hpf    Epithelial cells      0 - 5 /lpf    Bacteria      NEG /hpf    Test Description:          Reference Lab:          Results:          TSH      0.36 - 3.74 uIU/mL      Previous labs      ASSESSMENT/PLAN:      ICD-10-CM ICD-9-CM    1. Routine general medical examination at a health care facility Z00.00 V70.0 Healthy  CBC W/O DIFF      METABOLIC PANEL, COMPREHENSIVE      LIPID PANEL      URINALYSIS W/ RFLX MICROSCOPIC      TSH 3RD GENERATION   2. Vegan diet Z78.9 V49.89 VITAMIN B12   3. Vitamin D deficiency E55.9 268.9 VITAMIN D, 25 HYDROXY     4. Patient verbalized understanding and agreement with the plan. 5. Patient was given after visit summary. 6.   Follow-up Disposition:  Return in about 1 year (around 7/9/2019) for CPE. or sooner if worsening symptoms.         Bard Quintin MD

## 2018-07-10 DIAGNOSIS — D72.819 LEUKOPENIA, UNSPECIFIED TYPE: Primary | ICD-10-CM

## 2018-07-10 LAB — 25(OH)D3 SERPL-MCNC: 34.3 NG/ML (ref 30–100)

## 2018-07-25 ENCOUNTER — LAB ONLY (OUTPATIENT)
Dept: INTERNAL MEDICINE CLINIC | Age: 43
End: 2018-07-25

## 2018-07-25 ENCOUNTER — HOSPITAL ENCOUNTER (OUTPATIENT)
Dept: LAB | Age: 43
Discharge: HOME | End: 2018-07-25
Payer: OTHER GOVERNMENT

## 2018-07-25 DIAGNOSIS — D72.819 LEUKOPENIA, UNSPECIFIED TYPE: ICD-10-CM

## 2018-07-25 DIAGNOSIS — D70.9 NEUTROPENIA, UNSPECIFIED TYPE (HCC): Primary | ICD-10-CM

## 2018-07-25 LAB
BASOPHILS # BLD: 0 K/UL (ref 0–0.1)
BASOPHILS NFR BLD: 1 % (ref 0–2)
DIFFERENTIAL METHOD BLD: ABNORMAL
EOSINOPHIL # BLD: 0.1 K/UL (ref 0–0.4)
EOSINOPHIL NFR BLD: 1 % (ref 0–5)
ERYTHROCYTE [DISTWIDTH] IN BLOOD BY AUTOMATED COUNT: 13.2 % (ref 11.6–14.5)
HCT VFR BLD AUTO: 41.3 % (ref 35–45)
HGB BLD-MCNC: 14.3 G/DL (ref 12–16)
LYMPHOCYTES # BLD: 1.1 K/UL (ref 0.9–3.6)
LYMPHOCYTES NFR BLD: 30 % (ref 21–52)
MCH RBC QN AUTO: 31.8 PG (ref 24–34)
MCHC RBC AUTO-ENTMCNC: 34.6 G/DL (ref 31–37)
MCV RBC AUTO: 91.8 FL (ref 74–97)
MONOCYTES # BLD: 0.2 K/UL (ref 0.05–1.2)
MONOCYTES NFR BLD: 6 % (ref 3–10)
NEUTS SEG # BLD: 2.3 K/UL (ref 1.8–8)
NEUTS SEG NFR BLD: 62 % (ref 40–73)
PLATELET # BLD AUTO: 201 K/UL (ref 135–420)
PMV BLD AUTO: 11.9 FL (ref 9.2–11.8)
RBC # BLD AUTO: 4.5 M/UL (ref 4.2–5.3)
WBC # BLD AUTO: 3.7 K/UL (ref 4.6–13.2)

## 2018-07-25 PROCEDURE — 85025 COMPLETE CBC W/AUTO DIFF WBC: CPT | Performed by: INTERNAL MEDICINE

## 2018-07-25 PROCEDURE — 36415 COLL VENOUS BLD VENIPUNCTURE: CPT | Performed by: INTERNAL MEDICINE

## 2018-08-01 ENCOUNTER — TELEPHONE (OUTPATIENT)
Dept: INTERNAL MEDICINE CLINIC | Age: 43
End: 2018-08-01

## 2018-08-01 NOTE — TELEPHONE ENCOUNTER
2 pt. Identifiers confirmed. Pt. concerned that her WBC count has gone down from 3.9 to 3.7. She knows that there may not be anything happening and that she will have it rechecked in 1 month, but she wants to be sure c Dr. Ofelia Clayton that everything is okay and be advised on what the reasons for the value could be. Per Dr. Ofelia Clayton, the lower end of the reference range has changed from about 3.5 to 4.6. It could also be lab error, medications and she should not need to worry unless the trend continues downward to around the 2's (<3.0). Pt. Verbalized understanding and relief, no other questions/concerns at this time.

## 2018-09-06 ENCOUNTER — HOSPITAL ENCOUNTER (OUTPATIENT)
Dept: LAB | Age: 43
Discharge: HOME | End: 2018-09-06

## 2018-09-06 DIAGNOSIS — D70.9 NEUTROPENIA, UNSPECIFIED TYPE (HCC): ICD-10-CM

## 2018-09-25 ENCOUNTER — TELEPHONE (OUTPATIENT)
Dept: INTERNAL MEDICINE CLINIC | Age: 43
End: 2018-09-25

## 2018-09-25 DIAGNOSIS — D72.819 LEUKOPENIA, UNSPECIFIED TYPE: Primary | ICD-10-CM

## 2018-09-25 NOTE — TELEPHONE ENCOUNTER
Patient called for most recent labs drawn 9/6/18. No lab results listed for that date. Patient was in office 9/6/18 for lab draw. Order for 7/26/18 drawn 9/6/18 was cancelled \"No Sample Received\".

## 2018-09-26 NOTE — TELEPHONE ENCOUNTER
Pt contacted at home number. 2 pt identifiers confirmed. Pt informed that CBC that was collected on 09/06/2018 was cancelled by lab and she will need to be redrawn. Pt states that she will try to come into office on Friday 09/28/2018. Please reorder lab.

## 2018-10-16 ENCOUNTER — HOSPITAL ENCOUNTER (OUTPATIENT)
Dept: LAB | Age: 43
Discharge: HOME | End: 2018-10-16
Payer: OTHER GOVERNMENT

## 2018-10-16 DIAGNOSIS — D72.819 LEUKOPENIA, UNSPECIFIED TYPE: ICD-10-CM

## 2018-10-16 LAB
BASOPHILS # BLD: 0 K/UL (ref 0–0.1)
BASOPHILS NFR BLD: 0 % (ref 0–2)
DIFFERENTIAL METHOD BLD: ABNORMAL
EOSINOPHIL # BLD: 0 K/UL (ref 0–0.4)
EOSINOPHIL NFR BLD: 0 % (ref 0–5)
ERYTHROCYTE [DISTWIDTH] IN BLOOD BY AUTOMATED COUNT: 13 % (ref 11.6–14.5)
HCT VFR BLD AUTO: 45 % (ref 35–45)
HGB BLD-MCNC: 14.9 G/DL (ref 12–16)
LYMPHOCYTES # BLD: 1.2 K/UL (ref 0.9–3.6)
LYMPHOCYTES NFR BLD: 19 % (ref 21–52)
MCH RBC QN AUTO: 30.8 PG (ref 24–34)
MCHC RBC AUTO-ENTMCNC: 33.1 G/DL (ref 31–37)
MCV RBC AUTO: 93.2 FL (ref 74–97)
MONOCYTES # BLD: 0.4 K/UL (ref 0.05–1.2)
MONOCYTES NFR BLD: 6 % (ref 3–10)
NEUTS SEG # BLD: 4.7 K/UL (ref 1.8–8)
NEUTS SEG NFR BLD: 75 % (ref 40–73)
PLATELET # BLD AUTO: 225 K/UL (ref 135–420)
PMV BLD AUTO: 11.4 FL (ref 9.2–11.8)
RBC # BLD AUTO: 4.83 M/UL (ref 4.2–5.3)
WBC # BLD AUTO: 6.3 K/UL (ref 4.6–13.2)

## 2018-10-16 PROCEDURE — 85025 COMPLETE CBC W/AUTO DIFF WBC: CPT | Performed by: INTERNAL MEDICINE

## 2018-10-16 NOTE — LETTER
10/17/2018 7:06 AM 
 
Ms. Bonita Goldsmith [facility] [phone] Dear Jacquelyn Still: 
 
Please find your most recent results below. Resulted Orders CBC WITH AUTOMATED DIFF Result Value Ref Range WBC 6.3 4.6 - 13.2 K/uL  
 RBC 4.83 4.20 - 5.30 M/uL  
 HGB 14.9 12.0 - 16.0 g/dL HCT 45.0 35.0 - 45.0 % MCV 93.2 74.0 - 97.0 FL  
 MCH 30.8 24.0 - 34.0 PG  
 MCHC 33.1 31.0 - 37.0 g/dL  
 RDW 13.0 11.6 - 14.5 % PLATELET 677 794 - 586 K/uL MPV 11.4 9.2 - 11.8 FL  
 NEUTROPHILS 75 (H) 40 - 73 % LYMPHOCYTES 19 (L) 21 - 52 % MONOCYTES 6 3 - 10 % EOSINOPHILS 0 0 - 5 % BASOPHILS 0 0 - 2 %  
 ABS. NEUTROPHILS 4.7 1.8 - 8.0 K/UL  
 ABS. LYMPHOCYTES 1.2 0.9 - 3.6 K/UL  
 ABS. MONOCYTES 0.4 0.05 - 1.2 K/UL  
 ABS. EOSINOPHILS 0.0 0.0 - 0.4 K/UL  
 ABS. BASOPHILS 0.0 0.0 - 0.1 K/UL  
 DF AUTOMATED    
 
 
RECOMMENDATIONS: 
 
1) Your labs were all okay. 2) Your white blood cell count is normal.  
 
Please call me if you have any questions: [phone] Sincerely, Taylor Liang M.D.

## 2018-10-29 ENCOUNTER — CLINICAL SUPPORT (OUTPATIENT)
Dept: INTERNAL MEDICINE CLINIC | Age: 43
End: 2018-10-29

## 2018-10-29 DIAGNOSIS — Z23 ENCOUNTER FOR IMMUNIZATION: Primary | ICD-10-CM

## 2018-10-29 NOTE — PROGRESS NOTES
Jenny Serrano is a 38 y.o. female who presents for routine immunizations. She denies any symptoms , reactions or allergies that would exclude them from being immunized today. Risks and adverse reactions were discussed and the VIS was given to them. All questions were addressed. She was observed for 15 min post injection. There were no reactions observed.     Dexter Bland LPN

## 2019-02-11 ENCOUNTER — TELEPHONE (OUTPATIENT)
Dept: INTERNAL MEDICINE CLINIC | Age: 44
End: 2019-02-11

## 2019-02-11 DIAGNOSIS — J40 BRONCHITIS: Primary | ICD-10-CM

## 2019-02-11 RX ORDER — AZITHROMYCIN 250 MG/1
TABLET, FILM COATED ORAL
Qty: 6 TAB | Refills: 0 | Status: SHIPPED | OUTPATIENT
Start: 2019-02-11 | End: 2019-02-16

## 2019-02-11 NOTE — TELEPHONE ENCOUNTER
Patient calling to requests a 'Z-Pac'. Patient states she is taking care of two sick children as well and feels she has symptoms of bronchitis. Patient denied appointment has no childcare for sick children and would prefer not to bring them into the office.

## 2019-02-11 NOTE — TELEPHONE ENCOUNTER
Sent electronically:    Requested Prescriptions     Signed Prescriptions Disp Refills    azithromycin (ZITHROMAX) 250 mg tablet 6 Tab 0     Sig: Take 2 tablets today, then take 1 tablet daily     Authorizing Provider: Zora Asher     Does she want anything for cough?

## 2019-02-13 ENCOUNTER — OFFICE VISIT (OUTPATIENT)
Dept: INTERNAL MEDICINE CLINIC | Age: 44
End: 2019-02-13

## 2019-02-13 ENCOUNTER — HOSPITAL ENCOUNTER (OUTPATIENT)
Dept: LAB | Age: 44
Discharge: HOME | End: 2019-02-13
Payer: OTHER GOVERNMENT

## 2019-02-13 VITALS
OXYGEN SATURATION: 100 % | RESPIRATION RATE: 17 BRPM | SYSTOLIC BLOOD PRESSURE: 122 MMHG | TEMPERATURE: 99.2 F | DIASTOLIC BLOOD PRESSURE: 71 MMHG | HEART RATE: 95 BPM | BODY MASS INDEX: 25.39 KG/M2 | WEIGHT: 158 LBS | HEIGHT: 66 IN

## 2019-02-13 DIAGNOSIS — R68.89 FLU-LIKE SYMPTOMS: Primary | ICD-10-CM

## 2019-02-13 DIAGNOSIS — J02.9 SORE THROAT: ICD-10-CM

## 2019-02-13 PROCEDURE — 87070 CULTURE OTHR SPECIMN AEROBIC: CPT

## 2019-02-13 RX ORDER — OSELTAMIVIR PHOSPHATE 75 MG/1
75 CAPSULE ORAL 2 TIMES DAILY
Qty: 10 CAP | Refills: 0 | Status: SHIPPED | OUTPATIENT
Start: 2019-02-13 | End: 2019-02-18

## 2019-02-13 NOTE — PROGRESS NOTES
HISTORY OF PRESENT ILLNESS  Kenyon Horton is a 41 y.o. female. Patient of Dr Melisa Zaidi presents with body aches,fever,body ache,chills,sore throat. States she has been on Azithromycin x 3 days for a URI but her kids were all dx with the flu. She denies any NVD,dizziness,abd pain,SOB,CP. Review of Systems   Constitutional: Positive for chills and fever. HENT: Negative. Eyes: Negative. Respiratory: Positive for cough. Cardiovascular: Negative. Gastrointestinal: Negative. Genitourinary: Negative. Musculoskeletal: Negative. Body aches   Skin: Negative. Neurological: Negative. Psychiatric/Behavioral: Negative. Physical Exam   Constitutional: She is oriented to person, place, and time. She appears well-developed. HENT:   Head: Normocephalic. Mouth/Throat: Posterior oropharyngeal erythema present. Eyes: Pupils are equal, round, and reactive to light. Neck: Normal range of motion. Cardiovascular: Normal rate. Pulmonary/Chest: Effort normal and breath sounds normal.   Abdominal: Soft. Bowel sounds are normal.   Neurological: She is alert and oriented to person, place, and time. Skin: Skin is warm. Psychiatric: She has a normal mood and affect. Her behavior is normal. Judgment and thought content normal.       ASSESSMENT and PLAN    ICD-10-CM ICD-9-CM    1. Flu-like symptoms R68.89 780.99 oseltamivir (TAMIFLU) 75 mg capsule   2. Cold J00 460    3. Sore throat J02.9 462 CULTURE, STREP THROAT      oseltamivir (TAMIFLU) 75 mg capsule     Encounter Diagnoses   Name Primary?  Flu-like symptoms Yes    Cold     Sore throat      Orders Placed This Encounter    CULTURE, STREP THROAT    oseltamivir (TAMIFLU) 75 mg capsule     Orders Placed This Encounter    CULTURE, STREP THROAT     Standing Status:   Future     Standing Expiration Date:   2/14/2020    oseltamivir (TAMIFLU) 75 mg capsule     Sig: Take 1 Cap by mouth two (2) times a day for 5 days.      Dispense: 10 Cap     Refill:  0     Orders Placed This Encounter    CULTURE, STREP THROAT    oseltamivir (TAMIFLU) 75 mg capsule     Diagnoses and all orders for this visit:    1. Flu-like symptoms  -     oseltamivir (TAMIFLU) 75 mg capsule; Take 1 Cap by mouth two (2) times a day for 5 days. 2. Cold    3. Sore throat  -     CULTURE, STREP THROAT; Future  -     oseltamivir (TAMIFLU) 75 mg capsule; Take 1 Cap by mouth two (2) times a day for 5 days. Follow-up Disposition:  Return if symptoms worsen or fail to improve.   current treatment plan is effective, no change in therapy

## 2019-02-13 NOTE — PROGRESS NOTES
ROOM # 9  Identified pt with two pt identifiers(name and ). Reviewed record in preparation for visit and have obtained necessary documentation. Chief Complaint   Patient presents with    Fever    Cough     x 2 days. Both patinet kids was dx with the flu several days ago. Patient has been on z-josiah since 21.  Sore Throat     since last night. Bonita Goldsmith preferred language for health care discussion is english/other. Is the patient using any DME equipment during OV? NO    Bonita Jimmy Agarwal is due for: There are no preventive care reminders to display for this patient. Health Maintenance reviewed and discussed per provider  Please order/place referral if appropriate. Advance Directive:  1. Do you have an advance directive in place? Patient Reply: NO    2. If not, would you like material regarding how to put one in place? NO    Coordination of Care:  1. Have you been to the ER, urgent care clinic since your last visit? Hospitalized since your last visit? NO    2. Have you seen or consulted any other health care providers outside of the [address] [place] [address] since your last visit? Include any pap smears or colon screening. NO    Patient is accompanied by self I [facility] discuss any/all medical information while they are present in the room.     Learning Assessment:  Learning Assessment 10/8/2014   PRIMARY LEARNER Patient   HIGHEST LEVEL OF EDUCATION - PRIMARY LEARNER  4 YEARS OF COLLEGE   BARRIERS PRIMARY LEARNER NONE   CO-LEARNER CAREGIVER No   PRIMARY LANGUAGE ENGLISH   LEARNER PREFERENCE PRIMARY OTHER (COMMENT)   ANSWERED BY self   RELATIONSHIP SELF     Depression Screening:  3 most recent [facility] Screens 2018 2017 3/21/2017 2016 10/20/2015 10/8/2014   Little interest or pleasure in doing things Not at all Not at all Not at all Not at all Not at all Not at all   Feeling down, depressed, irritable, or hopeless Not at all Not at all Not at all Not at all Not at all Not at all   Total Score PHQ 2 0 0 0 0 0 0     Abuse Screening:  Abuse Screening Questionnaire 7/9/2018 10/8/2014   Do you ever feel afraid of your partner? N N   Are you in a relationship with someone who physically or mentally threatens you? N N   Is it safe for you to go home?  Y Y     Fall Risk  n/i

## 2019-02-13 NOTE — LETTER
NOTIFICATION RETURN TO WORK / SCHOOL 
 
2/13/2019 11:06 AM 
 
Ms. Bonita Goldsmith [address] To Whom It May Concern: 
 
Tatiana Padron is currently under the care of Cindi Samayoa. She will return to work/school on: 2/16/19 If there are questions or concerns please have the patient contact our office. Sincerely, Phyllis Pérez NP

## 2019-02-13 NOTE — PATIENT INSTRUCTIONS
H1N1 Influenza (Swine Flu): After Your Visit  Your Care Instructions  H1N1 flu, also called swine flu, is a type of influenza that is similar to the common flu. Like the common flu, the H1N1 flu comes on suddenly. The symptoms, such as a cough, sore throat, fever, chills, headaches, fatigue, and body aches and pains, are more severe than the common cold. These symptoms may last for 5 to 7 days. Although the H1N1 flu can make you feel very sick, it usually does not cause serious health problems. Home treatment is usually all you need for H1N1 flu symptoms. But your doctor may prescribe antiviral medicine which may prevent other health problems, such as pneumonia, from developing. Children and young adults, pregnant women, and those who have a long-term health problem, such as lung disease, are most at risk for having pneumonia or other health problems. Follow-up care is a key part of your treatment and safety. Be sure to make and go to all appointments, and call your doctor if you are having problems. It's also a good idea to know your test results and keep a list of the medicines you take. How can you care for yourself at home? · Get plenty of rest.  · Drink plenty of fluids, enough so that your urine is light yellow or clear like water. If you have kidney, heart, or liver disease and have to limit fluids, talk with your doctor before you increase the amount of fluids you drink. · Take an over-the-counter pain medicine if needed, such as acetaminophen (Tylenol), ibuprofen (Advil, Motrin), or naproxen (Aleve), to relieve fever, headache, and muscle aches. Be safe with medicines. Read and follow all instructions on the label. No one younger than 20 should take aspirin. It has been linked to Reye syndrome, a serious illness. · Do not take two or more pain medicines at the same time unless the doctor told you to. Many pain medicines have acetaminophen, which is Tylenol.  Too much acetaminophen (Tylenol) can be harmful. · Do not smoke. Smoking can make the H1N1 flu worse. If you need help quitting, talk to your doctor about stop-smoking programs and medicines. These can increase your chances of quitting for good. · Breathe moist air from a hot shower or from a sink filled with hot water to help clear a stuffy nose. · Before you use cough and cold medicines, check the label. These medicines may not be safe for young children or for people with certain health problems. · If the skin around your nose and lips becomes sore, put some petroleum jelly on the area. · To ease coughing:  ¨ Drink fluids to soothe a scratchy throat. ¨ Suck on cough drops or plain, hard candy. ¨ Try an over-the-counter cough medicine. Read and follow all instructions on the label. ¨ Raise your head at night with an extra pillow. This may help you rest if coughing keeps you awake. · Take any prescribed medicine exactly as directed. Call your doctor if you think you are having a problem with your medicine. To avoid spreading the H1N1 flu  · Wash your hands often, using soap and water. Alcohol-based hand  also work well. And keep your hands away from your face. · Stay home from school, work, and other public places until you are feeling better and your fever has been gone for at least 24 hours. The fever needs to have gone away on its own without the help of medicine. · Try to avoid being around other people. If you have to be around people (including those you live with), wear a mask over your nose and mouth if you can. · Ask people living with you, especially those at high risk for complications from the flu, to talk to their doctors about preventing the flu. They may get antiviral medicine to keep from getting the flu from you. · To prevent the flu in the future, get the flu vaccine every year, as soon as it's available. Encourage people living with you to get the vaccine. · Cover your mouth when you cough or sneeze.   When should you call for help? Call 911 anytime you think you may need emergency care. For example, call if:  · You have severe trouble breathing. Call your doctor now or seek immediate medical care if:  · You have increased trouble breathing. · You have a fever with a stiff neck or a severe headache. · You feel very sleepy or confused. Watch closely for changes in your health, and be sure to contact your doctor if:  · You have a new or higher fever. · Your symptoms get worse, or you seem to get better, then get worse again. · You do not get better after 5 to 7 days. Where can you learn more? Go to Connos[employer]er.be  Enter V242 in the search box to learn more about \"H1N1 Influenza (Swine Flu): After Your Visit. \"   © 0480-2318 Healthwise, Incorporated. Care instructions adapted under license by [employer] (which disclaims liability or warranty for this information). This care instruction is for use with your licensed healthcare professional. If you have questions about a medical condition or this instruction, always ask your healthcare professional. [place] [address] any warranty or liability for your use of this information.   Content Version: 76.1.332041; Current as of: February 5, 2014

## 2019-02-13 NOTE — PROGRESS NOTES
Patient of Dr Nuzhat Miller presents with body aches,fever,body ache,chills,sore throat. States she has been on Azithromycin x 3 days for a URI but her kids were all dx with the flu. She denies any NVD,dizziness,abd pain,SOB,CP.

## 2019-02-15 LAB
BACTERIA SPEC CULT: NORMAL
BACTERIA SPEC CULT: NORMAL
SERVICE CMNT-IMP: NORMAL

## 2019-04-24 ENCOUNTER — HOSPITAL ENCOUNTER (OUTPATIENT)
Dept: MAMMOGRAPHY | Age: 44
Discharge: HOME | End: 2019-04-24
Payer: OTHER GOVERNMENT

## 2019-04-24 ENCOUNTER — HOSPITAL ENCOUNTER (OUTPATIENT)
Dept: LAB | Age: 44
Discharge: HOME | End: 2019-04-24
Payer: OTHER GOVERNMENT

## 2019-04-24 DIAGNOSIS — Z12.31 VISIT FOR SCREENING MAMMOGRAM: ICD-10-CM

## 2019-04-24 PROCEDURE — 87624 HPV HI-RISK TYP POOLED RSLT: CPT

## 2019-04-24 PROCEDURE — 88175 CYTOPATH C/V AUTO FLUID REDO: CPT

## 2019-04-24 PROCEDURE — 77063 BREAST TOMOSYNTHESIS BI: CPT

## 2019-04-25 DIAGNOSIS — Z12.39 BREAST CANCER SCREENING: Primary | ICD-10-CM

## 2019-10-14 ENCOUNTER — CLINICAL SUPPORT (OUTPATIENT)
Dept: INTERNAL MEDICINE CLINIC | Age: 44
End: 2019-10-14

## 2019-10-14 DIAGNOSIS — Z23 ENCOUNTER FOR IMMUNIZATION: Primary | ICD-10-CM

## 2019-10-14 NOTE — PROGRESS NOTES
Presented for Influenza Vaccine. Administered in right deltoid without complaints. Pt observed for 5 min. No adverse reaction noted.  Pt left office in stable condition

## 2019-10-28 NOTE — TELEPHONE ENCOUNTER
Attempted to contact pt at  number, no answer. Lvm for pt to return call to office at [phone]. Will continue to try to contact pt. I received a phone call from Dr. Gabriel Joseph.  Patient has grade 2 endometrioid adenocarcinoma.  I tried calling her and got her voicemail.  I left a message for her to call the office for her biopsy report.  She will need a surgical intervention.

## 2019-11-27 ENCOUNTER — TELEPHONE (OUTPATIENT)
Dept: INTERNAL MEDICINE CLINIC | Age: 44
End: 2019-11-27

## 2019-11-27 RX ORDER — OSELTAMIVIR PHOSPHATE 75 MG/1
75 CAPSULE ORAL 2 TIMES DAILY
Qty: 10 CAP | Refills: 0 | Status: SHIPPED | OUTPATIENT
Start: 2019-11-27 | End: 2019-12-02

## 2019-11-27 NOTE — TELEPHONE ENCOUNTER
Patient is calling in stating both of her kids have tested positive for the flu. States last year when this happened Dr. Blessing Raymond called in  Tamiflu for her as a precaution, asking if this could please be done again.

## 2020-01-10 ENCOUNTER — OFFICE VISIT (OUTPATIENT)
Dept: FAMILY MEDICINE CLINIC | Age: 45
End: 2020-01-10

## 2020-01-10 VITALS
HEART RATE: 68 BPM | TEMPERATURE: 98.1 F | WEIGHT: 157 LBS | RESPIRATION RATE: 16 BRPM | DIASTOLIC BLOOD PRESSURE: 74 MMHG | BODY MASS INDEX: 25.23 KG/M2 | SYSTOLIC BLOOD PRESSURE: 112 MMHG | OXYGEN SATURATION: 98 % | HEIGHT: 66 IN

## 2020-01-10 DIAGNOSIS — Z00.00 ANNUAL PHYSICAL EXAM: Primary | ICD-10-CM

## 2020-01-10 DIAGNOSIS — R68.89 OTHER GENERAL SYMPTOMS AND SIGNS: ICD-10-CM

## 2020-01-10 DIAGNOSIS — M25.551 RIGHT HIP PAIN: ICD-10-CM

## 2020-01-10 DIAGNOSIS — E55.9 VITAMIN D DEFICIENCY: ICD-10-CM

## 2020-01-10 NOTE — PROGRESS NOTES
Tonya Alba is a 45 y.o. female (: 1975) presenting to address:    Chief Complaint   Patient presents with    Hip Pain     right hip since end of october tried rest, ice head . wants Mri        Vitals:    01/10/20 1022   BP: 112/74   Pulse: 68   Resp: 16   Temp: 98.1 °F (36.7 °C)   TempSrc: Oral   SpO2: 98%   Weight: 157 lb (71.2 kg)   Height: 5' 6\" (1.676 m)   PainSc:   1   PainLoc: Hip   LMP: 2019       Hearing/Vision:   No exam data present    Learning Assessment:     Learning Assessment 10/8/2014   PRIMARY LEARNER Patient   HIGHEST LEVEL OF EDUCATION - PRIMARY LEARNER  4 YEARS OF COLLEGE   BARRIERS PRIMARY LEARNER NONE   CO-LEARNER CAREGIVER No   PRIMARY LANGUAGE ENGLISH   LEARNER PREFERENCE PRIMARY OTHER (COMMENT)   ANSWERED BY self   RELATIONSHIP SELF     Depression Screening:     3 most recent PHQ Screens 1/10/2020   Little interest or pleasure in doing things Not at all   Feeling down, depressed, irritable, or hopeless Not at all   Total Score PHQ 2 0     Fall Risk Assessment:   No flowsheet data found. Abuse Screening:     Abuse Screening Questionnaire 2018   Do you ever feel afraid of your partner? N   Are you in a relationship with someone who physically or mentally threatens you? N   Is it safe for you to go home? Y     Coordination of Care Questionaire:   1. Have you been to the ER, urgent care clinic since your last visit? Hospitalized since your last visit? NO    2. Have you seen or consulted any other health care providers outside of the [address] [place] [address] since your last visit? Include any pap smears or colon screening. NO    Advanced Directive:   1. Do you have an Advanced Directive? NO    2. Would you like information on Advanced Directives?  NO

## 2020-01-10 NOTE — PROGRESS NOTES
SUBJECTIVE:   45 y.o. female for annual routine checkup. Pt has been having an on-going issue with her RT hip for over 2 months. 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 At some point, she developed a pain in the RT gluteal region that has never resolved. Does not typically have to take anything for it but will take Ibuprofen at times. It has not prevented her activities but she just wants to be free of this pain. Movement tends to always make it feel better. Current Outpatient Medications   Medication Sig Dispense Refill    B.infantis-B.ani-B.long-B.bifi (PROBIOTIC 4X) 10-15 mg TbEC Take  by mouth.  cyanocobalamin 1,000 mcg tablet Take 1,000 mcg by mouth daily. Indications: twice per week         Past Medical History:   Diagnosis Date    Genital herpes, unspecified     valtrex at 36wks (she has never had a flare)    Vitamin D deficiency 2017       Allergies: Patient has no known allergies. Patient's last menstrual period was 12/25/2019. ROS:  Feeling well. No dyspnea or chest pain on exertion. No abdominal pain, change in bowel habits, black or bloody stools. No urinary tract symptoms. GYN ROS: no breast pain or new or enlarging lumps on self exam. No neurological complaints. OBJECTIVE:   The patient appears well, alert, oriented x 3, in no distress. Visit Vitals  /74   Pulse 68   Temp 98.1 °F (36.7 °C) (Oral)   Resp 16   Ht 5' 6\" (1.676 m)   Wt 157 lb (71.2 kg)   LMP 12/25/2019   SpO2 98%   BMI 25.34 kg/m²       General appearance: alert, cooperative, no distress, appears stated age  Head: Normocephalic, without obvious abnormality, atraumatic  Ears: normal TM's and external ear canals AU  Throat: Lips, mucosa, and tongue normal. Teeth and gums normal  Neck: supple, symmetrical, trachea midline, no adenopathy, thyroid: not enlarged, symmetric, no tenderness/mass/nodules, no carotid bruit and no JVD  Back: symmetric, no curvature.  ROM normal. No CVA tenderness. Lungs: clear to auscultation bilaterally  Breasts: patient declines to have breast exam.  Heart: regular rate and rhythm, S1, S2 normal, no murmur, click, rub or gallop  Abdomen: soft, non-tender. Bowel sounds normal. No masses,  no organomegaly  Extremities: extremities normal, atraumatic, no cyanosis or edema  Pulses: 2+ and symmetric  Skin: Skin color, texture, turgor normal. No rashes or lesions  Neurological is normal, no focal findings. Lab Results   Component Value Date/Time    WBC 6.3 10/16/2018 09:34 AM    HGB 14.9 10/16/2018 09:34 AM    HCT 45.0 10/16/2018 09:34 AM    PLATELET 389 93/70/2280 09:34 AM    MCV 93.2 10/16/2018 09:34 AM         Lab Results   Component Value Date/Time    Sodium 141 07/09/2018 07:56 AM    Potassium 4.7 07/09/2018 07:56 AM    Chloride 106 07/09/2018 07:56 AM    CO2 29 07/09/2018 07:56 AM    Anion gap 6 07/09/2018 07:56 AM    Glucose 88 07/09/2018 07:56 AM    BUN 12 07/09/2018 07:56 AM    Creatinine 0.83 07/09/2018 07:56 AM    BUN/Creatinine ratio 14 07/09/2018 07:56 AM    GFR est AA >60 07/09/2018 07:56 AM    GFR est non-AA >60 07/09/2018 07:56 AM    Calcium 8.9 07/09/2018 07:56 AM         Lab Results   Component Value Date/Time    ALT (SGPT) 21 07/09/2018 07:56 AM    AST (SGOT) 14 (L) 07/09/2018 07:56 AM    Alk. phosphatase 44 (L) 07/09/2018 07:56 AM    Bilirubin, total 0.7 07/09/2018 07:56 AM         Lab Results   Component Value Date/Time    Cholesterol, total 169 07/09/2018 07:56 AM    HDL Cholesterol 77 (H) 07/09/2018 07:56 AM    LDL, calculated 81.8 07/09/2018 07:56 AM    VLDL, calculated 10.2 07/09/2018 07:56 AM    Triglyceride 51 07/09/2018 07:56 AM    CHOL/HDL Ratio 2.2 07/09/2018 07:56 AM         Lab Results   Component Value Date/Time    TSH 2.90 07/09/2018 07:56 AM         Lab Results   Component Value Date/Time    Vitamin D 25-Hydroxy 34.3 07/09/2018 07:56 AM             ASSESSMENT:   Diagnoses and all orders for this visit:    1. Right hip pain  -     XR HIP RT W OR WO PELV 2-3 VWS; Future          PLAN:   Mammogram: UTD. Pap: with her Gyn. Will await the results of her xray, may order MRI, referral to PT and or Sports med. The plan was discussed with the patient. The patient verbalized understanding and is in agreement with the plan. All medication potential side effects were discussed with the patient.

## 2020-01-10 NOTE — PATIENT INSTRUCTIONS
Hip Pain: Care Instructions  Your Care Instructions    Hip pain may be caused by many things, including overuse, a fall, or a twisting movement. Another cause of hip pain is arthritis. Your pain may increase when you stand up, walk, or squat. The pain may come and go or may be constant. Home treatment can help relieve hip pain, swelling, and stiffness. If your pain is ongoing, you may need more tests and treatment. Follow-up care is a key part of your treatment and safety. Be sure to make and go to all appointments, and call your doctor if you are having problems. It's also a good idea to know your test results and keep a list of the medicines you take. How can you care for yourself at home? · Take pain medicines exactly as directed. ? If the doctor gave you a prescription medicine for pain, take it as prescribed. ? If you are not taking a prescription pain medicine, ask your doctor if you can take an over-the-counter medicine. · Rest and protect your hip. Take a break from any activity, including standing or walking, that may cause pain. · Put ice or a cold pack against your hip for 10 to 20 minutes at a time. Try to do this every 1 to 2 hours for the next 3 days (when you are awake) or until the swelling goes down. Put a thin cloth between the ice and your skin. · Sleep on your healthy side with a pillow between your knees, or sleep on your back with pillows under your knees. · If there is no swelling, you can put moist heat, a heating pad, or a warm cloth on your hip. Do gentle stretching exercises to help keep your hip flexible. · Learn how to prevent falls. Have your vision and hearing checked regularly. Wear slippers or shoes with a nonskid sole. · Stay at a healthy weight. · Wear comfortable shoes. When should you call for help? Call 911 anytime you think you may need emergency care.  For example, call if:    · You have sudden chest pain and shortness of breath, or you cough up blood.     · You are not able to stand or walk or bear weight.     · Your buttocks, legs, or feet feel numb or tingly.     · Your leg or foot is cool or pale or changes color.     · You have severe pain.    Call your doctor now or seek immediate medical care if:    · You have signs of infection, such as:  ? Increased pain, swelling, warmth, or redness in the hip area. ? Red streaks leading from the hip area. ? Pus draining from the hip area. ? A fever.     · You have signs of a blood clot, such as:  ? Pain in your calf, back of the knee, thigh, or groin. ? Redness and swelling in your leg or groin.     · You are not able to bend, straighten, or move your leg normally.     · You have trouble urinating or having bowel movements.    Watch closely for changes in your health, and be sure to contact your doctor if:    · You do not get better as expected. Where can you learn more? Go to http://jeffrey-sam.info/. Enter L410 in the search box to learn more about \"Hip Pain: Care Instructions. \"  Current as of: June 26, 2019  Content Version: 12.2  © 9816-2010 Healthwise, Incorporated. Care instructions adapted under license by [employer] (which disclaims liability or warranty for this information). If you have questions about a medical condition or this instruction, always ask your healthcare professional. [place] [address] any warranty or liability for your use of this information.

## 2020-01-14 ENCOUNTER — TELEPHONE (OUTPATIENT)
Dept: FAMILY MEDICINE CLINIC | Age: 45
End: 2020-01-14

## 2020-01-14 NOTE — TELEPHONE ENCOUNTER
Pt is requesting a MRI due to her xray results.  She stated she would like to get the MRI before she starts physical therapy please advise

## 2020-01-14 NOTE — TELEPHONE ENCOUNTER
Spoke with the patient to let her know that Dr. Sarai Frias recommendation is that she try physical therapy first, and then if it does not help, then she will follow with an MRI, patient verbalized understanding.

## 2020-01-24 ENCOUNTER — HOSPITAL ENCOUNTER (OUTPATIENT)
Dept: PHYSICAL THERAPY | Age: 45
End: 2020-01-24

## 2020-10-16 ENCOUNTER — TRANSCRIBE ORDER (OUTPATIENT)
Dept: REGISTRATION | Age: 45
End: 2020-10-16

## 2020-10-16 ENCOUNTER — HOSPITAL ENCOUNTER (OUTPATIENT)
Dept: MAMMOGRAPHY | Age: 45
Discharge: HOME | End: 2020-10-16
Payer: OTHER GOVERNMENT

## 2020-10-16 DIAGNOSIS — Z12.39 ENCOUNTER FOR SCREENING FOR MALIGNANT NEOPLASM OF BREAST: ICD-10-CM

## 2020-10-16 DIAGNOSIS — Z12.39 ENCOUNTER FOR SCREENING FOR MALIGNANT NEOPLASM OF BREAST: Primary | ICD-10-CM

## 2020-10-16 PROCEDURE — 77063 BREAST TOMOSYNTHESIS BI: CPT

## 2021-07-15 ENCOUNTER — HOSPITAL ENCOUNTER (OUTPATIENT)
Dept: LAB | Age: 46
Discharge: HOME | End: 2021-07-15
Payer: OTHER GOVERNMENT

## 2021-07-15 LAB
25(OH)D3 SERPL-MCNC: 24.3 NG/ML (ref 30–100)
ALBUMIN SERPL-MCNC: 4.3 G/DL (ref 3.4–5)
ALBUMIN/GLOB SERPL: 1.5 {RATIO} (ref 0.8–1.7)
ALP SERPL-CCNC: 49 U/L (ref 45–117)
ALT SERPL-CCNC: 20 U/L (ref 13–56)
ANION GAP SERPL CALC-SCNC: 7 MMOL/L (ref 3–18)
APPEARANCE UR: CLEAR
AST SERPL-CCNC: 16 U/L (ref 10–38)
BACTERIA URNS QL MICRO: ABNORMAL /HPF
BASOPHILS # BLD: 0 K/UL (ref 0–0.1)
BASOPHILS NFR BLD: 0 % (ref 0–2)
BILIRUB SERPL-MCNC: 0.7 MG/DL (ref 0.2–1)
BILIRUB UR QL: NEGATIVE
BUN SERPL-MCNC: 13 MG/DL (ref 7–18)
BUN/CREAT SERPL: 18 (ref 12–20)
CALCIUM SERPL-MCNC: 9.2 MG/DL (ref 8.5–10.1)
CHLORIDE SERPL-SCNC: 105 MMOL/L (ref 100–111)
CHOLEST SERPL-MCNC: 196 MG/DL
CO2 SERPL-SCNC: 29 MMOL/L (ref 21–32)
COLOR UR: YELLOW
CREAT SERPL-MCNC: 0.72 MG/DL (ref 0.6–1.3)
DIFFERENTIAL METHOD BLD: ABNORMAL
EOSINOPHIL # BLD: 0 K/UL (ref 0–0.4)
EOSINOPHIL NFR BLD: 1 % (ref 0–5)
EPITH CASTS URNS QL MICRO: ABNORMAL /LPF (ref 0–5)
ERYTHROCYTE [DISTWIDTH] IN BLOOD BY AUTOMATED COUNT: 13 % (ref 11.6–14.5)
EST. AVERAGE GLUCOSE BLD GHB EST-MCNC: 85 MG/DL
GLOBULIN SER CALC-MCNC: 2.9 G/DL (ref 2–4)
GLUCOSE SERPL-MCNC: 86 MG/DL (ref 74–99)
GLUCOSE UR STRIP.AUTO-MCNC: NEGATIVE MG/DL
HBA1C MFR BLD: 4.6 % (ref 4.2–5.6)
HCT VFR BLD AUTO: 45.1 % (ref 35–45)
HDLC SERPL-MCNC: 83 MG/DL (ref 40–60)
HDLC SERPL: 2.4 {RATIO} (ref 0–5)
HGB BLD-MCNC: 14.8 G/DL (ref 12–16)
HGB UR QL STRIP: NEGATIVE
KETONES UR QL STRIP.AUTO: NEGATIVE MG/DL
LDLC SERPL CALC-MCNC: 101.4 MG/DL (ref 0–100)
LEUKOCYTE ESTERASE UR QL STRIP.AUTO: NEGATIVE
LIPID PROFILE,FLP: ABNORMAL
LYMPHOCYTES # BLD: 1.1 K/UL (ref 0.9–3.6)
LYMPHOCYTES NFR BLD: 17 % (ref 21–52)
MCH RBC QN AUTO: 30.5 PG (ref 24–34)
MCHC RBC AUTO-ENTMCNC: 32.8 G/DL (ref 31–37)
MCV RBC AUTO: 93 FL (ref 74–97)
MONOCYTES # BLD: 0.4 K/UL (ref 0.05–1.2)
MONOCYTES NFR BLD: 7 % (ref 3–10)
NEUTS SEG # BLD: 4.7 K/UL (ref 1.8–8)
NEUTS SEG NFR BLD: 75 % (ref 40–73)
NITRITE UR QL STRIP.AUTO: NEGATIVE
PH UR STRIP: 7 [PH] (ref 5–8)
PLATELET # BLD AUTO: 214 K/UL (ref 135–420)
PMV BLD AUTO: 12.3 FL (ref 9.2–11.8)
POTASSIUM SERPL-SCNC: 4.3 MMOL/L (ref 3.5–5.5)
PROT SERPL-MCNC: 7.2 G/DL (ref 6.4–8.2)
PROT UR STRIP-MCNC: NEGATIVE MG/DL
RBC # BLD AUTO: 4.85 M/UL (ref 4.2–5.3)
RBC #/AREA URNS HPF: NEGATIVE /HPF (ref 0–5)
SODIUM SERPL-SCNC: 141 MMOL/L (ref 136–145)
SP GR UR REFRACTOMETRY: <1.005 (ref 1–1.03)
T4 FREE SERPL-MCNC: 0.9 NG/DL (ref 0.7–1.5)
TRIGL SERPL-MCNC: 58 MG/DL (ref ?–150)
TSH SERPL DL<=0.05 MIU/L-ACNC: 2.41 UIU/ML (ref 0.36–3.74)
UROBILINOGEN UR QL STRIP.AUTO: 0.2 EU/DL (ref 0.2–1)
VLDLC SERPL CALC-MCNC: 11.6 MG/DL
WBC # BLD AUTO: 6.3 K/UL (ref 4.6–13.2)
WBC URNS QL MICRO: NEGATIVE /HPF (ref 0–5)

## 2021-07-15 PROCEDURE — 36415 COLL VENOUS BLD VENIPUNCTURE: CPT

## 2021-07-15 PROCEDURE — 85025 COMPLETE CBC W/AUTO DIFF WBC: CPT

## 2021-07-15 PROCEDURE — 83036 HEMOGLOBIN GLYCOSYLATED A1C: CPT

## 2021-07-15 PROCEDURE — 84443 ASSAY THYROID STIM HORMONE: CPT

## 2021-07-15 PROCEDURE — 81001 URINALYSIS AUTO W/SCOPE: CPT

## 2021-07-15 PROCEDURE — 82306 VITAMIN D 25 HYDROXY: CPT

## 2021-07-15 PROCEDURE — 84439 ASSAY OF FREE THYROXINE: CPT

## 2021-07-15 PROCEDURE — 80061 LIPID PANEL: CPT

## 2021-07-15 PROCEDURE — 80053 COMPREHEN METABOLIC PANEL: CPT

## 2022-01-12 ENCOUNTER — HOSPITAL ENCOUNTER (OUTPATIENT)
Dept: LAB | Age: 47
Discharge: HOME | End: 2022-01-12
Payer: OTHER GOVERNMENT

## 2022-01-12 ENCOUNTER — HOSPITAL ENCOUNTER (OUTPATIENT)
Dept: WOMENS IMAGING | Age: 47
Discharge: HOME | End: 2022-01-12
Attending: OBSTETRICS & GYNECOLOGY
Payer: OTHER GOVERNMENT

## 2022-01-12 DIAGNOSIS — Z12.31 BREAST CANCER SCREENING BY MAMMOGRAM: ICD-10-CM

## 2022-01-12 PROCEDURE — 87624 HPV HI-RISK TYP POOLED RSLT: CPT

## 2022-01-12 PROCEDURE — 88175 CYTOPATH C/V AUTO FLUID REDO: CPT

## 2022-01-12 PROCEDURE — 77063 BREAST TOMOSYNTHESIS BI: CPT

## 2022-03-18 PROBLEM — H92.02 LEFT EAR PAIN: Status: ACTIVE | Noted: 2017-03-31

## 2022-03-19 PROBLEM — E55.9 VITAMIN D DEFICIENCY: Status: ACTIVE | Noted: 2017-01-01

## 2023-01-17 ENCOUNTER — HOSPITAL ENCOUNTER (OUTPATIENT)
Dept: WOMENS IMAGING | Age: 48
Discharge: HOME | End: 2023-01-17
Attending: OBSTETRICS & GYNECOLOGY
Payer: OTHER GOVERNMENT

## 2023-01-17 DIAGNOSIS — Z12.31 ENCOUNTER FOR SCREENING MAMMOGRAM FOR BREAST CANCER: ICD-10-CM

## 2023-01-17 PROCEDURE — 77063 BREAST TOMOSYNTHESIS BI: CPT

## 2024-01-23 ENCOUNTER — TRANSCRIBE ORDERS (OUTPATIENT)
Facility: HOSPITAL | Age: 49
End: 2024-01-23

## 2024-01-23 DIAGNOSIS — Z12.31 VISIT FOR SCREENING MAMMOGRAM: Primary | ICD-10-CM

## 2024-01-25 ENCOUNTER — HOSPITAL ENCOUNTER (OUTPATIENT)
Dept: WOMENS IMAGING | Facility: HOSPITAL | Age: 49
Discharge: HOME | End: 2024-01-25
Payer: OTHER GOVERNMENT

## 2024-01-25 DIAGNOSIS — Z12.31 VISIT FOR SCREENING MAMMOGRAM: ICD-10-CM

## 2024-01-25 PROCEDURE — 77067 SCR MAMMO BI INCL CAD: CPT

## 2024-08-21 ENCOUNTER — APPOINTMENT (OUTPATIENT)
Facility: HOSPITAL | Age: 49
End: 2024-08-21
Payer: OTHER GOVERNMENT

## 2024-08-27 ENCOUNTER — HOSPITAL ENCOUNTER (OUTPATIENT)
Facility: HOSPITAL | Age: 49
Setting detail: RECURRING SERIES
Discharge: HOME | End: 2024-08-30
Payer: OTHER GOVERNMENT

## 2024-08-27 PROCEDURE — 97110 THERAPEUTIC EXERCISES: CPT

## 2024-08-27 PROCEDURE — 97530 THERAPEUTIC ACTIVITIES: CPT

## 2024-08-27 PROCEDURE — 97161 PT EVAL LOW COMPLEX 20 MIN: CPT

## 2024-08-27 NOTE — PROGRESS NOTES
[facility] PHYSICAL THERAPY  [address] [address] Phone: [phone] Fax   Plan of Care / Statement of Necessity for Physical Therapy Services     Patient Name: Kelly Steele : 1975   Medical   Diagnosis: Left foot pain [M79.672]  Right foot pain [M79.671] Treatment Diagnosis: M79.671  RIGHT FOOT PAIN and M79.672  LEFT FOOT PAIN      Onset Date: 2024 Payor Payor: [facility] / Plan:  [facility] / Product Type: *No Product type* /    Referral Source: An Vu MD Start of Care (SOC): 2024   Prior Hospitalization: See medical history Provider #: [id number]   Prior Level of Function: Functionally independent, enjoys [employer] classes   Comorbidities: None reported  Social determinants of health: none reported     Assessment / key information:    Pt is a pleasant 48 y.o. female who presents with c/o bilateral foot/ankle pain. The patient reports an insidious onset of right Achilles tendon pain approximately 6 months ago that is primarily exacerbated with deep squatting and participation in [employer] classes. She notes more recent development of B foot pain, noting cramping feeling and tenderness in B feet, right > left. Signs/symptoms at eval consistent with B metatarsalgia likely related to increased weight bearing demands placed on forefoot B due to right calcaneal tendinopathy limiting dorsiflexion.  Functional deficits include: impaired ankle dorsiflexion AROM, impaired hip strength, impaired weight bearing tolerance due to foot pain.  Rehab potential is good due to desire to attain PLOF. Pt would benefit from skilled PT to address above deficits to improve Pt's function and ability to return to PLOF with decreased pain and improved functional mobility.      Evaluation Complexity:  History:  LOW Complexity : Zero comorbidities / personal factors that will impact the outcome / POC; Examination:  LOW Complexity :

## 2024-08-27 NOTE — PROGRESS NOTES
Therapeutic Activity (timed):  use of dynamic activities replicating functional movements to increase ROM, strength, coordination, balance, and proprioception in order to improve patient's ability to progress to PLOF and address remaining functional goals.  (see flow sheet as applicable)     Details if applicable:  Patient education on therapy assessment, prognosis, expectations for therapy sessions, patient goals, limited ankle DF contributing to increased forefoot pain, relative rest from barefoot pure [employer] , and HEP.   Total  21  Reminder: MC/BC bill using total billable min of TIMED therapeutic procedures (example: do not include dry needle or estim unattended, both untimed codes, in totals to left)     [x]  Patient Education billed concurrently with other procedures     Other Objective/Functional Measures:     Physical Therapy Evaluation - Ankle    Observation: Clemons's toe present B  Palpation: TTP at right mid portion calcaneal tendon  TTP to B metartarsal heads (especially right 2-3), B plantar fascia    Ankle AROM:                                           AROM (deg)                 Right Left Effect   Dorsiflexion with Knee Bent 7 5    Dorsiflexion with Knee Extended 3 5    Plantar Flexion 50 45    Inversion 40 36    Eversion 6 10    Great Toe Extension WNL WNL      Strength:   Right (/5) Left (/5)   Hip     Flexion 5 5             Abduction 4+ 5             Adduction               Extension 4 4   Ankle   Dorsiflexion 5 5               PF NT NT               Inversion 5 5               Eversion 5 5       Gait: unremarkable    Functional Squat: increased weight placed through forefoot B, limited heel contact with floor, decreased depth    Stair Negotiation: reciprocal    Balance: SLS 30\" B    Special Tests:      Right Left   Navicular Drop -   -     ASSESSMENT/Changes in Function: see POC    Patient will continue to benefit from skilled PT services to modify and progress therapeutic interventions, analyze  and address functional mobility deficits, analyze and address ROM deficits, analyze and address strength deficits, analyze and address soft tissue restrictions, analyze and cue for proper movement patterns, analyze and modify for postural abnormalities, analyze and address imbalance/dizziness, and instruct in home and community integration to address functional deficits and attain remaining goals.    [x]  See Plan of Care for goals and assessment     PLAN  [x]  Upgrade activities as tolerated     [x]  Continue plan of care  []  Update interventions per flow sheet       []  Other:_      Devaughn Mccarthy, PT 8/27/2024  2:43 PM

## 2024-09-03 ENCOUNTER — HOSPITAL ENCOUNTER (OUTPATIENT)
Facility: HOSPITAL | Age: 49
Setting detail: RECURRING SERIES
Discharge: HOME | End: 2024-09-06
Payer: OTHER GOVERNMENT

## 2024-09-03 PROCEDURE — 97112 NEUROMUSCULAR REEDUCATION: CPT

## 2024-09-03 PROCEDURE — 97110 THERAPEUTIC EXERCISES: CPT

## 2024-09-03 PROCEDURE — 97140 MANUAL THERAPY 1/> REGIONS: CPT

## 2024-09-03 NOTE — PROGRESS NOTES
PHYSICAL / OCCUPATIONAL THERAPY - DAILY TREATMENT NOTE    Patient Name: Kelly Steele    Date: 9/3/2024    : 1975  Insurance: Payor:  E[facility] / Plan:  [facility] / Product Type: *No Product type* /      Patient  verified Yes     Visit #   Current / Total 2 10   Time   In / Out 8:22 am 9:05 am   Pain   In / Out 2/10 0/10   Subjective Functional Status/Changes: Patient notes the stretching has been helpful.     TREATMENT AREA =  Left foot pain [M79.672]  Right foot pain [M79.671]     OBJECTIVE        Therapeutic Procedures:  Tx Min Procedure, Rationale, Specifics   19 65572 Therapeutic Exercise (timed):  increase ROM, strength, coordination, balance, and proprioception to improve patient's ability to progress to PLOF and address remaining functional goals. (see flow sheet as applicable)     Details if applicable:  stretching, strengthening     14 13753 Neuromuscular Re-Education (timed):  improve balance, coordination, kinesthetic sense, posture, core stability and proprioception to improve patient's ability to develop conscious control of individual muscles and awareness of position of extremities in order to progress to PLOF and address remaining functional goals. (see flow sheet as applicable)     Details if applicable:  intrinsic foot re-education, weight shifting and distribution     10 96006 Manual Therapy (timed):  decrease pain, increase ROM, and increase tissue extensibility to improve patient's ability to progress to PLOF and address remaining functional goals.  The manual therapy interventions were performed at a separate and distinct time from the therapeutic activities interventions . (see flow sheet as applicable)     Details if applicable:  DTM to plantar surface of (B) feet f/b TCJ mobs to improve DF and hindfoot eversion             Details if applicable:      [facility] Totals Reminder: bill using total billable min of TIMED therapeutic procedures (example: do not include dry needle

## 2024-09-06 ENCOUNTER — HOSPITAL ENCOUNTER (OUTPATIENT)
Facility: HOSPITAL | Age: 49
Setting detail: RECURRING SERIES
Discharge: HOME | End: 2024-09-09
Payer: OTHER GOVERNMENT

## 2024-09-06 PROCEDURE — 97110 THERAPEUTIC EXERCISES: CPT

## 2024-09-06 PROCEDURE — 97112 NEUROMUSCULAR REEDUCATION: CPT

## 2024-09-06 PROCEDURE — 97140 MANUAL THERAPY 1/> REGIONS: CPT

## 2024-09-09 ENCOUNTER — HOSPITAL ENCOUNTER (OUTPATIENT)
Facility: HOSPITAL | Age: 49
Setting detail: RECURRING SERIES
Discharge: HOME | End: 2024-09-12
Payer: OTHER GOVERNMENT

## 2024-09-09 PROCEDURE — 97112 NEUROMUSCULAR REEDUCATION: CPT

## 2024-09-09 PROCEDURE — 97140 MANUAL THERAPY 1/> REGIONS: CPT

## 2024-09-09 PROCEDURE — 97110 THERAPEUTIC EXERCISES: CPT

## 2024-09-11 ENCOUNTER — HOSPITAL ENCOUNTER (OUTPATIENT)
Facility: HOSPITAL | Age: 49
Setting detail: RECURRING SERIES
Discharge: HOME | End: 2024-09-14
Payer: OTHER GOVERNMENT

## 2024-09-11 PROCEDURE — 97112 NEUROMUSCULAR REEDUCATION: CPT

## 2024-09-11 PROCEDURE — 97140 MANUAL THERAPY 1/> REGIONS: CPT

## 2024-09-11 PROCEDURE — 97110 THERAPEUTIC EXERCISES: CPT

## 2024-09-18 ENCOUNTER — HOSPITAL ENCOUNTER (OUTPATIENT)
Facility: HOSPITAL | Age: 49
Setting detail: RECURRING SERIES
Discharge: HOME | End: 2024-09-21
Payer: OTHER GOVERNMENT

## 2024-09-18 PROCEDURE — 97140 MANUAL THERAPY 1/> REGIONS: CPT

## 2024-09-18 PROCEDURE — 97110 THERAPEUTIC EXERCISES: CPT

## 2024-09-18 PROCEDURE — 97112 NEUROMUSCULAR REEDUCATION: CPT

## 2024-09-18 PROCEDURE — G0283 ELEC STIM OTHER THAN WOUND: HCPCS

## 2024-09-20 ENCOUNTER — APPOINTMENT (OUTPATIENT)
Facility: HOSPITAL | Age: 49
End: 2024-09-20
Payer: OTHER GOVERNMENT

## 2024-09-25 ENCOUNTER — HOSPITAL ENCOUNTER (OUTPATIENT)
Facility: HOSPITAL | Age: 49
Setting detail: RECURRING SERIES
Discharge: HOME | End: 2024-09-28
Payer: OTHER GOVERNMENT

## 2024-09-25 PROCEDURE — 97112 NEUROMUSCULAR REEDUCATION: CPT

## 2024-09-25 PROCEDURE — 97530 THERAPEUTIC ACTIVITIES: CPT

## 2024-09-25 PROCEDURE — 97110 THERAPEUTIC EXERCISES: CPT

## 2024-09-25 PROCEDURE — 97140 MANUAL THERAPY 1/> REGIONS: CPT

## 2024-09-25 PROCEDURE — G0283 ELEC STIM OTHER THAN WOUND: HCPCS

## 2024-09-27 ENCOUNTER — HOSPITAL ENCOUNTER (OUTPATIENT)
Facility: HOSPITAL | Age: 49
Setting detail: RECURRING SERIES
End: 2024-09-27
Payer: OTHER GOVERNMENT

## 2024-09-30 ENCOUNTER — HOSPITAL ENCOUNTER (OUTPATIENT)
Facility: HOSPITAL | Age: 49
Setting detail: RECURRING SERIES
Discharge: HOME | End: 2024-10-03
Payer: OTHER GOVERNMENT

## 2024-09-30 PROCEDURE — 97112 NEUROMUSCULAR REEDUCATION: CPT

## 2024-09-30 PROCEDURE — 97140 MANUAL THERAPY 1/> REGIONS: CPT

## 2024-09-30 PROCEDURE — 97110 THERAPEUTIC EXERCISES: CPT

## 2024-09-30 PROCEDURE — 97530 THERAPEUTIC ACTIVITIES: CPT

## 2024-09-30 NOTE — PROGRESS NOTES
[facility] [place]   [address] [place] [address]  Phone: [phone] Fax: [phone]  PROGRESS NOTE  Patient Name: Kelly Steele : 1975   Treatment/Medical Diagnosis: Left foot pain [M79.672]  Right foot pain [M79.671]   Referral Source:  Payor An Vu MD  Payor:  EAST / Plan:  EAST SELECT / Product Type: *No Product type* /      Date of Initial Visit: 24 Attended Visits: 8 Missed Visits: 2     SUMMARY OF TREATMENT  Pt is a pleasant 48 y.o. female who presents with c/o bilateral foot/ankle pain. The patient reports an insidious onset of right Achilles tendon pain approximately 6 months ago that is primarily exacerbated with deep squatting and participation in [employer] [place] classes. She notes more recent development of B foot pain, noting cramping feeling and tenderness in B feet, right > left. Treatment thus far has consisted of therapeutic exercises to address strength/ROM deficits, therapeutic activities for functional movement restoration, neuromuscular re-education for static/dynamic stabilization, patient education on self care strategies and HEP, manual therapy to address soft tissue restrictions, and modalities for symptom modulation.      CURRENT STATUS  Patient has attended 8 out of 10 sessions from 24-24, making steady progress in PT.  She reports a significant decrease in pain across the Achilles tendon with walking, squatting, and stairs, although notes continued increase in pain across the plantar > dorsum of the forefoot adjacent to the metatarsophalangeal joints. C/c regarding continued pain when walking for long distances, standing for longer durations, and after recreation ([place] class). This may likely be due to compensations in weight shifting due to limited ankle DF ROM.    Mobility: hyper-mobility passive toe extension of all toes (B)    Observation: Patient unable to achieve any 
analyze and address imbalance/dizziness, and instruct in home and community integration to address functional deficits and attain remaining goals.    Progress toward goals / Updated goals:  []  See Progress Note/Recertification    Short Term Goals: To be accomplished in 1 week  - Goal: Pt to be compliant with initial HEP to improve ankle dorsiflexion mobility.  Status at last note/certification: Established and reviewed with Pt  Current: goal met; pt notes compliance (9/3/24)    Long Term Goals: To be accomplished in 10 treatments  - Goal: Pt to report no more than 3/10 pain at worst to improve ease with ambulation with her dog.  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  Status at last note/certification: 4/5 B  Current: progressing, addressing with strengthening interventions (9/18/2024)   - Goal: Pt to demo at least 10 deg B ankle DF with knee extended to improve quality of gait.  Status at last note/certification: right 3 deg, left 5 deg  Current: progressing, right 8 deg, left 11 deg (9/6/24)  - Goal: Pt to report LEFS score of at least 57 pts to show improved function and quality of life.  Status at last note/certification: 48    Next PN/RC due 9/27/24  JESSICA    PLAN  [x] Continue plan of care  [x]  Upgrade activities as tolerated  []  Discharge due to :  [x]  Other: please address R TCJ mobs/manips for improved ankle joint mobility; see PN; 1-2x4    Jerel Sheikh, PTA    9/30/2024    6:27 PM    If an interpreting service was utilized for treatment of this patient, the contents of this document represent the material reviewed with the patient via the

## 2024-10-02 ENCOUNTER — HOSPITAL ENCOUNTER (OUTPATIENT)
Facility: HOSPITAL | Age: 49
Setting detail: RECURRING SERIES
Discharge: HOME | End: 2024-10-05
Payer: OTHER GOVERNMENT

## 2024-10-02 PROCEDURE — 97140 MANUAL THERAPY 1/> REGIONS: CPT

## 2024-10-02 PROCEDURE — 97112 NEUROMUSCULAR REEDUCATION: CPT

## 2024-10-02 PROCEDURE — 97110 THERAPEUTIC EXERCISES: CPT

## 2024-10-02 PROCEDURE — G0283 ELEC STIM OTHER THAN WOUND: HCPCS

## 2024-10-02 NOTE — PROGRESS NOTES
PHYSICAL / OCCUPATIONAL THERAPY - DAILY TREATMENT NOTE (updated )    Patient Name: Kelly Steele    Date: 10/2/2024    : 1975  Insurance: Payor:  EAST / Plan:  EAST SELECT / Product Type: *No Product type* /      Patient  verified yes     Visit #   Current / Total 1 8 Total Time   Time   In / Out 8:20 9:15 55   Pain   In / Out 1 0    Subjective Functional Status/Changes: The ball of my feet are still a bit more irritated than my Achilles.     TREATMENT AREA =  Left foot pain [M79.672]  Right foot pain [M79.671]    OBJECTIVE    Modalities Rationale:     decrease pain to improve patient's ability to progress to PLOF and address remaining functional goals.             --  10 min [x] Estim Unattended,   Type:  Location:  Position:   premod  B plantar aspect of forefoot  Supine w/ wedge                                     []  w/ice    [x]  w/heat   Skin assessment post-treatment (if applicable):    [x]  intact    []  redness- no adverse reaction                 []redness - adverse reaction:           Therapeutic Procedures:  45  Total 45  Total [facility] Totals Reminder: bill using total billable min of TIMED therapeutic procedures (example: do not include dry needle or estim unattended, both untimed codes, in totals to left)  8-22 min = 1 unit; 23-37 min = 2 units; 38-52 min = 3 units; 53-67 min = 4 units; 68-82 min = 5 units   Tx Min Billable or 1:1 Min (if diff from Tx Min) Procedure, Rationale, Specifics   15  [id number] Therapeutic Exercise (timed):  increase ROM, strength, coordination, balance, and proprioception to improve patient's ability to progress to PLOF and address remaining functional goals. (see flow sheet as applicable)   Details if applicable:       15  06784 Neuromuscular Re-Education (timed):  improve balance, coordination, kinesthetic sense, posture, core stability and proprioception to improve patient's ability to develop conscious control of individual muscles and awareness of

## 2024-10-09 ENCOUNTER — APPOINTMENT (OUTPATIENT)
Facility: HOSPITAL | Age: 49
End: 2024-10-09
Payer: OTHER GOVERNMENT

## 2024-10-11 ENCOUNTER — HOSPITAL ENCOUNTER (OUTPATIENT)
Facility: HOSPITAL | Age: 49
Setting detail: RECURRING SERIES
Discharge: HOME | End: 2024-10-14
Payer: OTHER GOVERNMENT

## 2024-10-11 PROCEDURE — 97140 MANUAL THERAPY 1/> REGIONS: CPT

## 2024-10-11 PROCEDURE — 97110 THERAPEUTIC EXERCISES: CPT

## 2024-10-11 PROCEDURE — 97112 NEUROMUSCULAR REEDUCATION: CPT

## 2024-10-18 NOTE — PROGRESS NOTES
[facility] [place]   [address] [place] [address]  Phone: [phone] Fax: [phone]  PROGRESS NOTE  Patient Name: Kelly Steele : 1975   Treatment/Medical Diagnosis: Left foot pain [M79.672]  Right foot pain [M79.671]   Referral Source:  Payor An Vu MD  Payor:  EAST / Plan:  EAST SELECT / Product Type: *No Product type* /      Date of Initial Visit: 24 Attended Visits: 8 Missed Visits: 2     SUMMARY OF TREATMENT  Pt is a pleasant 48 y.o. female who presents with c/o bilateral foot/ankle pain. The patient reports an insidious onset of right Achilles tendon pain approximately 6 months ago that is primarily exacerbated with deep squatting and participation in [employer] [place] classes. She notes more recent development of B foot pain, noting cramping feeling and tenderness in B feet, right > left. Treatment thus far has consisted of therapeutic exercises to address strength/ROM deficits, therapeutic activities for functional movement restoration, neuromuscular re-education for static/dynamic stabilization, patient education on self care strategies and HEP, manual therapy to address soft tissue restrictions, and modalities for symptom modulation.      CURRENT STATUS  Patient has attended 8 out of 10 sessions from 24-24, making steady progress in PT.  She reports a significant decrease in pain across the Achilles tendon with walking, squatting, and stairs, although notes continued increase in pain across the plantar > dorsum of the forefoot adjacent to the metatarsophalangeal joints. C/c regarding continued pain when walking for long distances, standing for longer durations, and after recreation ([place] class). This may likely be due to compensations in weight shifting due to limited ankle DF ROM.    Mobility: hyper-mobility passive toe extension of all toes (B)    Observation: Patient unable to achieve any 
PHYSICAL / OCCUPATIONAL THERAPY - DAILY TREATMENT NOTE    Patient Name: Kelly Steele    Date: 10/11/2024    : 1975  Insurance: Payor:  EAST / Plan:  EAST SELECT / Product Type: *No Product type* /      Patient  verified Yes     Visit #   Current / Total 2 8   Time   In / Out 9:00 am 9:39 am   Pain   In / Out 1/10 0/10   Subjective Functional Status/Changes: Patient notes MD recommended metatarsal pads which have been very effective when standing. She reports using the yoga toes for 15 minutes prior to her foot getting too sore. She requests D/C due to high copay and general vast improvement in symptoms since last two sessions.     TREATMENT AREA =  Left foot pain [M79.672]  Right foot pain [M79.671]     OBJECTIVE  Therapeutic Procedures:  Tx Min Billable or 1:1 Min (if diff from Tx Min) Procedure, Rationale, Specifics   23 23 [id number] Therapeutic Exercise (timed):  increase ROM, strength, coordination, balance, and proprioception to improve patient's ability to progress to PLOF and address remaining functional goals. (see flow sheet as applicable)     Details if applicable:  reassessment     8 8 47079 Neuromuscular Re-Education (timed):  improve balance, coordination, kinesthetic sense, posture, core stability and proprioception to improve patient's ability to develop conscious control of individual muscles and awareness of position of extremities in order to progress to PLOF and address remaining functional goals. (see flow sheet as applicable)     Details if applicable:  gluteal re-education     8 8 95158 Manual Therapy (timed):  decrease pain, increase ROM, and increase tissue extensibility to improve patient's ability to progress to PLOF and address remaining functional goals.  The manual therapy interventions were performed at a separate and distinct time from the therapeutic activities interventions . (see flow sheet as applicable)     Details if applicable:  DTM to (B) calves in prone; 
Physical Therapy Discharge Instructions      [facility] - [facility]   [address] [address] [phone] fax      Patient: Kelly Steele  : 1975      Continue Home Exercise Program 1-3 times per day:      Continue with    [x] Ice               Follow up with MD:     [x] As needed      Recommendations:     [x]   Return to activity with home program          Additional Comments: Debo, thank you for your hard work during your therapy sessions and congratulations on your progress! It has been a pleasure working with you. Please reach out in the future if you have any questions.              Jerel Sheikh, JOHN   10/11/2024   9:34 AM  
pts to show improved function and quality of life.  Status at last note/certification: 48  Current: goal not met; remains 48    RECOMMENDATIONS  Discharge at this time per patient request secondary to high co-pay & subjective improvement in condition since prior to start of PT.         If you have any questions/comments please contact us directly at [phone].   Thank you for allowing us to assist in the care of your patient.  PTA signature       Therapist Signature: Guillermina Rodriguez PT Date: 10/18/2024    Reporting Period: 8/27/2024-10/11/2024  Time: 10:09 AM      
Lovenox 40mg qd  DASH/TLC CC diet

## 2025-01-31 ENCOUNTER — HOSPITAL ENCOUNTER (OUTPATIENT)
Dept: WOMENS IMAGING | Facility: HOSPITAL | Age: 50
Discharge: HOME | End: 2025-01-31
Payer: OTHER GOVERNMENT

## 2025-01-31 DIAGNOSIS — Z12.31 VISIT FOR SCREENING MAMMOGRAM: ICD-10-CM

## 2025-01-31 PROCEDURE — 77063 BREAST TOMOSYNTHESIS BI: CPT
